# Patient Record
Sex: MALE | Race: WHITE | NOT HISPANIC OR LATINO
[De-identification: names, ages, dates, MRNs, and addresses within clinical notes are randomized per-mention and may not be internally consistent; named-entity substitution may affect disease eponyms.]

---

## 2017-09-05 ENCOUNTER — APPOINTMENT (OUTPATIENT)
Dept: ORTHOPEDIC SURGERY | Facility: CLINIC | Age: 52
End: 2017-09-05
Payer: OTHER MISCELLANEOUS

## 2017-09-05 VITALS — HEIGHT: 68 IN | BODY MASS INDEX: 28.19 KG/M2 | WEIGHT: 186 LBS

## 2017-09-05 DIAGNOSIS — Z87.891 PERSONAL HISTORY OF NICOTINE DEPENDENCE: ICD-10-CM

## 2017-09-05 DIAGNOSIS — M54.2 CERVICALGIA: ICD-10-CM

## 2017-09-05 DIAGNOSIS — Z80.1 FAMILY HISTORY OF MALIGNANT NEOPLASM OF TRACHEA, BRONCHUS AND LUNG: ICD-10-CM

## 2017-09-05 PROCEDURE — 99203 OFFICE O/P NEW LOW 30 MIN: CPT

## 2017-09-05 PROCEDURE — 72040 X-RAY EXAM NECK SPINE 2-3 VW: CPT

## 2017-10-03 ENCOUNTER — APPOINTMENT (OUTPATIENT)
Dept: ORTHOPEDIC SURGERY | Facility: CLINIC | Age: 52
End: 2017-10-03
Payer: OTHER MISCELLANEOUS

## 2017-10-03 PROCEDURE — 99213 OFFICE O/P EST LOW 20 MIN: CPT

## 2019-06-10 ENCOUNTER — RECORD ABSTRACTING (OUTPATIENT)
Age: 54
End: 2019-06-10

## 2019-06-10 DIAGNOSIS — Z94.0 KIDNEY TRANSPLANT STATUS: ICD-10-CM

## 2019-06-10 DIAGNOSIS — N13.729 VESICOURETERAL-REFLUX WITH REFLUX NEPHROPATHY W/OUT HYDROURETER, UNSPECIFIED: ICD-10-CM

## 2019-06-10 DIAGNOSIS — Z92.89 PERSONAL HISTORY OF OTHER MEDICAL TREATMENT: ICD-10-CM

## 2019-06-10 DIAGNOSIS — D49.9 NEOPLASM OF UNSPECIFIED BEHAVIOR OF UNSPECIFIED SITE: ICD-10-CM

## 2019-06-10 DIAGNOSIS — Q60.5 RENAL HYPOPLASIA, UNSPECIFIED: ICD-10-CM

## 2019-06-10 DIAGNOSIS — Z85.828 PERSONAL HISTORY OF OTHER MALIGNANT NEOPLASM OF SKIN: ICD-10-CM

## 2019-06-10 DIAGNOSIS — Z99.2 DEPENDENCE ON RENAL DIALYSIS: ICD-10-CM

## 2019-06-10 DIAGNOSIS — Z83.49 FAMILY HISTORY OF OTHER ENDOCRINE, NUTRITIONAL AND METABOLIC DISEASES: ICD-10-CM

## 2019-06-10 DIAGNOSIS — Z80.8 FAMILY HISTORY OF MALIGNANT NEOPLASM OF OTHER ORGANS OR SYSTEMS: ICD-10-CM

## 2019-06-10 RX ORDER — NIACINAMIDE 500 MG
500 TABLET ORAL DAILY
Refills: 0 | Status: ACTIVE | COMMUNITY

## 2019-07-29 ENCOUNTER — APPOINTMENT (OUTPATIENT)
Dept: NEPHROLOGY | Facility: CLINIC | Age: 54
End: 2019-07-29
Payer: COMMERCIAL

## 2019-07-29 VITALS
HEIGHT: 68 IN | RESPIRATION RATE: 14 BRPM | SYSTOLIC BLOOD PRESSURE: 118 MMHG | HEART RATE: 69 BPM | BODY MASS INDEX: 29.1 KG/M2 | OXYGEN SATURATION: 97 % | WEIGHT: 192 LBS | DIASTOLIC BLOOD PRESSURE: 74 MMHG

## 2019-07-29 DIAGNOSIS — E05.90 THYROTOXICOSIS, UNSPECIFIED W/OUT THYROTOXIC CRISIS OR STORM: ICD-10-CM

## 2019-07-29 DIAGNOSIS — N40.0 BENIGN PROSTATIC HYPERPLASIA WITHOUT LOWER URINARY TRACT SYMPMS: ICD-10-CM

## 2019-07-29 DIAGNOSIS — Z85.828 PERSONAL HISTORY OF OTHER MALIGNANT NEOPLASM OF SKIN: ICD-10-CM

## 2019-07-29 DIAGNOSIS — Z87.39 PERSONAL HISTORY OF OTHER DISEASES OF THE MUSCULOSKELETAL SYSTEM AND CONNECTIVE TISSUE: ICD-10-CM

## 2019-07-29 DIAGNOSIS — M50.20 OTHER CERVICAL DISC DISPLACEMENT, UNSPECIFIED CERVICAL REGION: ICD-10-CM

## 2019-07-29 PROCEDURE — 99215 OFFICE O/P EST HI 40 MIN: CPT

## 2019-07-29 RX ORDER — AZATHIOPRINE 50 MG/1
TABLET ORAL
Refills: 0 | Status: DISCONTINUED | COMMUNITY
End: 2019-07-29

## 2019-07-29 RX ORDER — MULTIVITAMIN
TABLET ORAL DAILY
Refills: 0 | Status: DISCONTINUED | COMMUNITY
End: 2019-07-29

## 2019-07-29 RX ORDER — AZATHIOPRINE 50 1/1
50 TABLET ORAL DAILY
Refills: 0 | Status: DISCONTINUED | COMMUNITY
End: 2019-07-29

## 2019-07-29 RX ORDER — RANITIDINE HYDROCHLORIDE 150 MG/1
150 CAPSULE ORAL TWICE DAILY
Refills: 0 | Status: DISCONTINUED | COMMUNITY
End: 2019-07-29

## 2019-07-29 RX ORDER — MULTIVIT-MIN/FOLIC/VIT K/LYCOP 400-300MCG
25 MCG TABLET ORAL
Refills: 0 | Status: DISCONTINUED | COMMUNITY
End: 2019-07-29

## 2019-07-29 NOTE — REVIEW OF SYSTEMS
[Nocturia] : nocturia [Negative] : Neurological [Hesitancy] : no urinary hesitancy [FreeTextEntry8] : nocturia x 1-2, good urinary stream.  The stream was poor for around a month though this resolved.

## 2019-07-29 NOTE — ASSESSMENT
[FreeTextEntry1] : (1) Renal Transplant.  The BUN/creatinine is as follows:  34/1.8 (05/2016), 26/1.70 (01/2017), 30/1.71 (03/2018), 34/2.22 (08/2018), 36/2.0 (09/2018), 25/2.2 (02/2019), and most recently 37/2.13 (07/17/2019).  The urine protein to creatinine ratio is 1510 mg per gram.  BK virus is negative.  The serum creatinine has trended up slowly over the past few years.  \par \par (2) Hypertension.  Well controlled.\par \par (3) Dyslipidemia. LDL 93 mg/dL, HDL 38 mg/dL,  mg/dL, total cholesterol 198 mg/dL.\par \par (4) Squamous cell carcinoma\par \par (5) Hypercalcemia.  Unclear etiology.  The iPTH is 43 pg/cc.  The vitamin D and multivitamin might be contributing. \par \par PLAN:\par \par (1) Jayme has already stopped the vitamin D and multivitamin.  He will stay off of them for now.\par (2) Repeat lab set in around 6 weeks along with a full proteinuria workup.\par (3) Will check a PSA.\par (4) Thyroid function tests. \par (5) Stay well hydrated. \par (6) Maintenance:  I am advising Jayme to get a colonoscopy. \par (7) Continue to take Lipitor.

## 2019-07-29 NOTE — PHYSICAL EXAM
[General Appearance - Alert] : alert [General Appearance - In No Acute Distress] : in no acute distress [General Appearance - Well-Appearing] : healthy appearing [General Appearance - Well Developed] : well developed [General Appearance - Well Nourished] : well nourished [Sclera] : the sclera and conjunctiva were normal [Extraocular Movements] : extraocular movements were intact [Neck Appearance] : the appearance of the neck was normal [] : no respiratory distress [Auscultation Breath Sounds / Voice Sounds] : lungs were clear to auscultation bilaterally [Exaggerated Use Of Accessory Muscles For Inspiration] : no accessory muscle use [Heart Rate And Rhythm] : heart rate was normal and rhythm regular [Heart Sounds Gallop] : no gallops [Murmurs] : no murmurs [Heart Sounds] : normal S1 and S2 [Edema] : there was no peripheral edema [Heart Sounds Pericardial Friction Rub] : no pericardial rub [Bowel Sounds] : normal bowel sounds [Abdomen Tenderness] : non-tender [Abdomen Soft] : soft [Abnormal Walk] : normal gait [No Spinal Tenderness] : no spinal tenderness [No CVA Tenderness] : no ~M costovertebral angle tenderness [Involuntary Movements] : no involuntary movements were seen [No Focal Deficits] : no focal deficits [Impaired Insight] : insight and judgment were intact [Affect] : the affect was normal [Oriented To Time, Place, And Person] : oriented to person, place, and time [Mood] : the mood was normal [FreeTextEntry1] : small right facial light colored lesion

## 2019-11-19 ENCOUNTER — APPOINTMENT (OUTPATIENT)
Dept: NEPHROLOGY | Facility: CLINIC | Age: 54
End: 2019-11-19
Payer: COMMERCIAL

## 2019-11-19 VITALS
RESPIRATION RATE: 16 BRPM | BODY MASS INDEX: 29.1 KG/M2 | SYSTOLIC BLOOD PRESSURE: 126 MMHG | HEART RATE: 76 BPM | HEIGHT: 68 IN | WEIGHT: 192 LBS | OXYGEN SATURATION: 96 % | DIASTOLIC BLOOD PRESSURE: 82 MMHG

## 2019-11-19 DIAGNOSIS — K21.9 GASTRO-ESOPHAGEAL REFLUX DISEASE W/OUT ESOPHAGITIS: ICD-10-CM

## 2019-11-19 DIAGNOSIS — Z00.00 ENCOUNTER FOR GENERAL ADULT MEDICAL EXAMINATION W/OUT ABNORMAL FINDINGS: ICD-10-CM

## 2019-11-19 PROCEDURE — 99214 OFFICE O/P EST MOD 30 MIN: CPT

## 2019-11-19 NOTE — PHYSICAL EXAM
[General Appearance - Alert] : alert [General Appearance - In No Acute Distress] : in no acute distress [General Appearance - Well Nourished] : well nourished [General Appearance - Well Developed] : well developed [Sclera] : the sclera and conjunctiva were normal [General Appearance - Well-Appearing] : healthy appearing [Extraocular Movements] : extraocular movements were intact [Neck Appearance] : the appearance of the neck was normal [] : no respiratory distress [Auscultation Breath Sounds / Voice Sounds] : lungs were clear to auscultation bilaterally [Heart Rate And Rhythm] : heart rate was normal and rhythm regular [Exaggerated Use Of Accessory Muscles For Inspiration] : no accessory muscle use [Respiration, Rhythm And Depth] : normal respiratory rhythm and effort [Heart Sounds Gallop] : no gallops [Heart Sounds] : normal S1 and S2 [Heart Sounds Pericardial Friction Rub] : no pericardial rub [Murmurs] : no murmurs [Bowel Sounds] : normal bowel sounds [Edema] : there was no peripheral edema [Abdomen Soft] : soft [No CVA Tenderness] : no ~M costovertebral angle tenderness [Abdomen Tenderness] : non-tender [No Spinal Tenderness] : no spinal tenderness [Abnormal Walk] : normal gait [Involuntary Movements] : no involuntary movements were seen [No Focal Deficits] : no focal deficits [Oriented To Time, Place, And Person] : oriented to person, place, and time [Affect] : the affect was normal [Impaired Insight] : insight and judgment were intact [Mood] : the mood was normal

## 2019-11-19 NOTE — CONSULT LETTER
[Consult Letter:] : I had the pleasure of evaluating your patient, [unfilled]. [Dear  ___] : Dear  [unfilled], [Please see my note below.] : Please see my note below. [Consult Closing:] : Thank you very much for allowing me to participate in the care of this patient.  If you have any questions, please do not hesitate to contact me. [Sincerely,] : Sincerely, [DrDemetrio  ___] : Dr. ARREDONDO [FreeTextEntry3] : Jean

## 2019-11-19 NOTE — HISTORY OF PRESENT ILLNESS
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 In 1984 he underwent a living related renal transplant at [facility] with Dr. Wong Higuera.  The donor was his mother.  I started seeing Mr. Bates after Dr. Samson Negrete moved his office.  His last visit to [facility] was "a long time ago".  He had a diagnosis of squamous cell carcinoma.  It was excised.  his last visit to his dermatologist was around 6 months ago.  He is going again on December 14, 2019.  \par \par Jayme has been doing well.  He gets a cough "every year around October".  It has been present for around 5 weeks.  it is a "dry" non productive cough.  Last year he used "some kind of inhaler".  It worked.  He is not sure which inhaler it was. Otherwise, he has been doing well.

## 2020-04-26 ENCOUNTER — MESSAGE (OUTPATIENT)
Age: 55
End: 2020-04-26

## 2020-05-11 ENCOUNTER — RX RENEWAL (OUTPATIENT)
Age: 55
End: 2020-05-11

## 2020-06-23 ENCOUNTER — APPOINTMENT (OUTPATIENT)
Dept: NEPHROLOGY | Facility: CLINIC | Age: 55
End: 2020-06-23
Payer: COMMERCIAL

## 2020-06-23 VITALS
DIASTOLIC BLOOD PRESSURE: 82 MMHG | BODY MASS INDEX: 30.01 KG/M2 | HEART RATE: 75 BPM | OXYGEN SATURATION: 97 % | TEMPERATURE: 98.1 F | RESPIRATION RATE: 16 BRPM | WEIGHT: 198 LBS | HEIGHT: 68 IN | SYSTOLIC BLOOD PRESSURE: 128 MMHG

## 2020-06-23 DIAGNOSIS — Z12.5 ENCOUNTER FOR SCREENING FOR MALIGNANT NEOPLASM OF PROSTATE: ICD-10-CM

## 2020-06-23 PROCEDURE — 99214 OFFICE O/P EST MOD 30 MIN: CPT

## 2020-06-23 NOTE — ASSESSMENT
[FreeTextEntry1] : (1) Renal Transplant.  The BUN/creatinine is as follows:  34/1.8 (05/2016), 26/1.70 (01/2017), 30/1.71 (03/2018), 34/2.22 (08/2018), 36/2.0 (09/2018), 25/2.2 (02/2019),  37/2.13 (07/17/2019), 32/2.02 (11/11/2019), 42/2.19 (05/30/2020).  The urine protein to creatinine ratio was around 1.5 grams per gram though has recently increased to 1.9 grams per gram.   The BK virus by PCR is negative. \par \par (2) Hypertension.  Well controlled.\par \par (3) Dyslipidemia. LDL 99 mg/dL, HDL 41 mg/dL,  mg/dL, total cholesterol 190 mg/dL.\par \par (4) Squamous cell carcinoma.  \par \par (5) Hypercalcemia.  The prior calcium was 10.8 mg/dL with a serum albumin 3.3 g/dL.  The work up included: iPTH was 43 pg/cc, 1,2 5 dihydroxy vitamin D level i22.4 pg/cc, 25 hydroxy vitamin D 29.9 ng/cc, TSH 1.170,  SPEP (-)  and urine immunofixation studies (-).  Mr. Bates stopped taking Tums on a regular basis after starting pantoprazole.  His serum calcium has normalized. at 9.7 mg/dL (albumin 4.1 g/dL).\par \par (6) Mild hyperparathyroidism (iPTH 80 pg/mL) with a low 25 hydroxy vitamin D level (24.8 ng/mL).\par \par (7) Metabolic acidosis.  This is mild (19 meq/L).\par \par (8) Proteinuria.  The nephronic workup was negative. \par \par PLAN:\par \par (1) Continue the pantoprazole. \par (2) Start vitamin D3 1000 IU every other day.\par (3) Given that squamous cell carcinoma has been known to metastasize in renal transplant patients, Mr. Bates will continue to see Dr. Aaron Falk every 3 months.  \par (4) Continue the current regimen.\par (5) Repeat the lab studies in 3 month.\par (6) I made no changes to the medication regimen. \par \par I placed a call to Dr. Catracho Rodriguez of the [facility].  I will follow up with Mr. Bates after I speak with him. \par \par

## 2020-06-23 NOTE — PHYSICAL EXAM
[General Appearance - Alert] : alert [General Appearance - In No Acute Distress] : in no acute distress [General Appearance - Well Nourished] : well nourished [General Appearance - Well Developed] : well developed [Extraocular Movements] : extraocular movements were intact [General Appearance - Well-Appearing] : healthy appearing [Sclera] : the sclera and conjunctiva were normal [Neck Appearance] : the appearance of the neck was normal [Respiration, Rhythm And Depth] : normal respiratory rhythm and effort [] : no respiratory distress [Heart Rate And Rhythm] : heart rate was normal and rhythm regular [Exaggerated Use Of Accessory Muscles For Inspiration] : no accessory muscle use [Auscultation Breath Sounds / Voice Sounds] : lungs were clear to auscultation bilaterally [Murmurs] : no murmurs [Heart Sounds Gallop] : no gallops [Heart Sounds] : normal S1 and S2 [Bowel Sounds] : normal bowel sounds [Heart Sounds Pericardial Friction Rub] : no pericardial rub [Edema] : there was no peripheral edema [Abdomen Soft] : soft [No CVA Tenderness] : no ~M costovertebral angle tenderness [Abdomen Tenderness] : non-tender [No Spinal Tenderness] : no spinal tenderness [Involuntary Movements] : no involuntary movements were seen [Abnormal Walk] : normal gait [No Focal Deficits] : no focal deficits [Oriented To Time, Place, And Person] : oriented to person, place, and time [Affect] : the affect was normal [Impaired Insight] : insight and judgment were intact [Mood] : the mood was normal [Nail Clubbing] : no clubbing  or cyanosis of the fingernails [FreeTextEntry1] : bilateral UE/LE skin lesions

## 2020-06-23 NOTE — REVIEW OF SYSTEMS
[Negative] : Heme/Lymph [Feeling Tired] : feeling tired [Recent Weight Gain (___ Lbs)] : recent [unfilled] ~Ulb weight gain [Nocturia] : nocturia [As Noted in HPI] : as noted in HPI [FreeTextEntry2] : "I'm not a great sleeper" [FreeTextEntry8] : nocturia x 1

## 2020-06-23 NOTE — CONSULT LETTER
[Dear  ___] : Dear  [unfilled], [Please see my note below.] : Please see my note below. [Consult Letter:] : I had the pleasure of evaluating your patient, [unfilled]. [Consult Closing:] : Thank you very much for allowing me to participate in the care of this patient.  If you have any questions, please do not hesitate to contact me. [Sincerely,] : Sincerely, [DrDemetrio  ___] : Dr. ARREDONDO [FreeTextEntry3] : Jean

## 2020-06-23 NOTE — HISTORY OF PRESENT ILLNESS
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 In 1984 he underwent a living related renal transplant at [facility] with Dr. Wong Higuera.  The donor was his mother.  I started seeing Mr. Bates after Dr. Samson Negrete moved his office.  His last visit to [facility] was "a long time ago".  He had a diagnosis of squamous cell carcinoma.  It was excised.  his last visit to his dermatologist was 2 weeks ago.  Some lesions were frozen.  He had 5-FU injections in his hands.  \par \par Jayme has been doing well.  He continues to work in the hospital.  He has not been ill.  He recently changed his Zantac to pantoprazole 20 mg once daily. \par

## 2020-06-24 ENCOUNTER — TRANSCRIPTION ENCOUNTER (OUTPATIENT)
Age: 55
End: 2020-06-24

## 2020-09-22 ENCOUNTER — APPOINTMENT (OUTPATIENT)
Dept: NEPHROLOGY | Facility: CLINIC | Age: 55
End: 2020-09-22
Payer: COMMERCIAL

## 2020-09-22 VITALS
RESPIRATION RATE: 16 BRPM | SYSTOLIC BLOOD PRESSURE: 118 MMHG | WEIGHT: 194 LBS | HEART RATE: 68 BPM | BODY MASS INDEX: 29.4 KG/M2 | HEIGHT: 68 IN | DIASTOLIC BLOOD PRESSURE: 58 MMHG | TEMPERATURE: 98 F | OXYGEN SATURATION: 95 %

## 2020-09-22 PROCEDURE — 99214 OFFICE O/P EST MOD 30 MIN: CPT

## 2020-09-22 NOTE — ASSESSMENT
[FreeTextEntry1] : (1) Renal Transplant.  The BUN/creatinine is as follows:  34/1.8 (05/2016), 26/1.70 (01/2017), 30/1.71 (03/2018), 34/2.22 (08/2018), 36/2.0 (09/2018), 25/2.2 (02/2019),  37/2.13 (07/17/2019), 32/2.02 (11/11/2019), 42/2.19 (05/30/2020).  The urine protein to creatinine ratio was around 1.5 grams per gram though then  increased to 1.9 grams per gram.   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  Mr. Bates was started on losartan 25 mg once daily.  He has been tolerating this.  His repeat BUN/creatinine levels are 43/2.23 (09/11/2020).  His urine protein to creatinine ratio improved to 1236 mg per gram.  His BK virus (plasma PCR) remains negative. His serum carbon dioxide is 18 meq per liter.  \par \par (2) Hypertension.  Well controlled.\par \par (3) Dyslipidemia. LDL 88 mg/dL, HDL 35 mg/dL,  mg/dL, total cholesterol 162 mg/dL.\par \par (4) Squamous cell carcinoma.  Scheduled for resection on 10/06/2020.\par \par (5) Hypercalcemia.  The workup was negative.  The serum calcium normalized after discontinuing Tums.\par \par (6) Mild hyperparathyroidism (iPTH 80 pg/mL) with a low 25 hydroxy vitamin D level (24.8 ng/mL). This resolved on oral vitamin D.  iPTH 41 pg/mL, 25 hydroxy vitamin D 31.4 ng/mL. \par \par (7) Metabolic acidosis.  The serum carbon dioxide is 18 meq per liter. \par \par (8) Proteinuria.  The nephronic workup was negative. Biopsy --> FSGS.\par \par (9) PSA.  1.2 ng/mL\par \par PLAN:\par \par (1) Continue the pantoprazole. \par (2) Continue vitamin D3 1000 IU every day.\par (3) Given that squamous cell carcinoma has been known to metastasize in renal transplant patients, Mr. Bates will continue to see Dr. Aaron Falk every 3 months.  \par (4) Continue the current regimen.\par (5) Repeat the lab studies in 3 month.\par (6) I made no changes to the medication regimen. \par \par I placed a call to Dr. Catracho Rodriguez of the [facility].  I will follow up with Mr. Bates after I speak with him. \par \par

## 2020-09-22 NOTE — PHYSICAL EXAM
[General Appearance - Alert] : alert [General Appearance - In No Acute Distress] : in no acute distress [General Appearance - Well Nourished] : well nourished [General Appearance - Well Developed] : well developed [General Appearance - Well-Appearing] : healthy appearing [Sclera] : the sclera and conjunctiva were normal [Extraocular Movements] : extraocular movements were intact [Neck Appearance] : the appearance of the neck was normal [] : no respiratory distress [Respiration, Rhythm And Depth] : normal respiratory rhythm and effort [Exaggerated Use Of Accessory Muscles For Inspiration] : no accessory muscle use [Auscultation Breath Sounds / Voice Sounds] : lungs were clear to auscultation bilaterally [Heart Rate And Rhythm] : heart rate was normal and rhythm regular [Heart Sounds] : normal S1 and S2 [Heart Sounds Gallop] : no gallops [Murmurs] : no murmurs [Heart Sounds Pericardial Friction Rub] : no pericardial rub [Edema] : there was no peripheral edema [Bowel Sounds] : normal bowel sounds [Abdomen Soft] : soft [Abdomen Tenderness] : non-tender [No CVA Tenderness] : no ~M costovertebral angle tenderness [No Spinal Tenderness] : no spinal tenderness [Abnormal Walk] : normal gait [Involuntary Movements] : no involuntary movements were seen [FreeTextEntry1] : bilateral UE/LE skin lesions [No Focal Deficits] : no focal deficits [Oriented To Time, Place, And Person] : oriented to person, place, and time [Impaired Insight] : insight and judgment were intact [Affect] : the affect was normal [Mood] : the mood was normal

## 2020-09-22 NOTE — REVIEW OF SYSTEMS
[Feeling Tired] : feeling tired [Recent Weight Gain (___ Lbs)] : recent [unfilled] ~Ulb weight gain [Nocturia] : nocturia [As Noted in HPI] : as noted in HPI [Negative] : Heme/Lymph [FreeTextEntry2] : "I'm not a great sleeper" [FreeTextEntry8] : nocturia x 1

## 2020-09-22 NOTE — CONSULT LETTER
[Dear  ___] : Dear  [unfilled], [Consult Letter:] : I had the pleasure of evaluating your patient, [unfilled]. [Please see my note below.] : Please see my note below. [Consult Closing:] : Thank you very much for allowing me to participate in the care of this patient.  If you have any questions, please do not hesitate to contact me. [Sincerely,] : Sincerely, [FreeTextEntry3] : Jean [DrDemetrio  ___] : Dr. ARREDONDO

## 2020-09-22 NOTE — HISTORY OF PRESENT ILLNESS
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 In 1984 he underwent a living related renal transplant at [facility] with Dr. Wong Higuera.  The donor was his mother.  I started seeing Mr. Bates after Dr. Samson Negrete moved his office.  His [place] [facility] [place] this [place].  He underwent a biopsy of the renal transplant which noted FSGS without any compoenents of rejection.\par \par Jayme is here for follow up.  He is having another squamous cell carcinoma removed (this time from his left ear) on October 6 with Dr. Falk.  he is feeling well.  He continues to take pantoprazole 20 mg once daily.  He is no longer using Tums.

## 2020-11-01 ENCOUNTER — RX RENEWAL (OUTPATIENT)
Age: 55
End: 2020-11-01

## 2021-01-14 ENCOUNTER — NON-APPOINTMENT (OUTPATIENT)
Age: 56
End: 2021-01-14

## 2021-01-19 ENCOUNTER — APPOINTMENT (OUTPATIENT)
Dept: NEPHROLOGY | Facility: CLINIC | Age: 56
End: 2021-01-19
Payer: COMMERCIAL

## 2021-01-19 VITALS
HEIGHT: 68 IN | OXYGEN SATURATION: 98 % | BODY MASS INDEX: 28.95 KG/M2 | HEART RATE: 78 BPM | TEMPERATURE: 97.2 F | RESPIRATION RATE: 16 BRPM | WEIGHT: 191 LBS | DIASTOLIC BLOOD PRESSURE: 80 MMHG | SYSTOLIC BLOOD PRESSURE: 122 MMHG

## 2021-01-19 PROCEDURE — 99214 OFFICE O/P EST MOD 30 MIN: CPT

## 2021-01-19 PROCEDURE — 99072 ADDL SUPL MATRL&STAF TM PHE: CPT

## 2021-01-19 NOTE — HISTORY OF PRESENT ILLNESS
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 In 1984 he underwent a living related renal transplant at [facility] with Dr. Wong Higuera.  The donor was his mother.  I started seeing Mr. Bates after Dr. Samson Negrete moved his office.  His last visit to [facility] [place] earlier this summer.  He underwent a biopsy of the renal transplant which noted FSGS without any components of rejection.\par \par Jayme is here for follow up.  He recently had a squamous cell carcinoma removed with Dr. Aaron Falk.  He is concerned about his weight, commenting, "I'm not a good eater".

## 2021-01-19 NOTE — REVIEW OF SYSTEMS
[Feeling Tired] : feeling tired [Recent Weight Gain (___ Lbs)] : recent [unfilled] ~Ulb weight gain [Nocturia] : nocturia [As Noted in HPI] : as noted in HPI [Negative] : Heme/Lymph [FreeTextEntry2] : "I'm not a great sleeper" [FreeTextEntry8] : nocturia x 2

## 2021-01-19 NOTE — ASSESSMENT
[FreeTextEntry1] : (1) Renal Transplant.  The BUN/creatinine is as follows:  34/1.8 (05/2016), 26/1.70 (01/2017), 30/1.71 (03/2018), 34/2.22 (08/2018), 36/2.0 (09/2018), 25/2.2 (02/2019),  37/2.13 (07/17/2019), 32/2.02 (11/11/2019), 42/2.19 (05/30/2020), 43/2.23 (09/11/2020),  44/2.21 (01/09/2021).  The urine protein to creatinine ratio was around 1.5 grams per gram though then  increased to 1.9 grams per gram.   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  Mr. Bates was started on losartan 25 mg once daily.  His urine protein to creatniine ratio has decreased to 1.24 grams per gram.  Repeat urine protein to creatinine ratio is1.37 grams per gram. \par \par (2) Hypertension.  Well controlled.\par \par (3) Dyslipidemia. No recent studies. \par \par (4) Squamous cell carcinoma.  Recent resection. \par \par (5) Hypercalcemia.  The workup was negative.  The serum calcium normalized after discontinuing Tums.\par \par (6) Mild hyperparathyroidism.  The 25 hydroxy vitamin D level is 19.1 ng/mL. \par \par (7) Metabolic acidosis.  The serum carbon dioxide is 19 meq per liter. \par \par (8) Proteinuria.  The nephronic workup was negative. Biopsy --> FSGS.  See above. \par \par (9) PSA.  1.2 ng/mL\par \par (10) Obesity.  Raphael has a BMI of 29.04 kg per meter square.  He is no longer obese.  He is merely overweight though feels he eats very poorly. \par \par PLAN:\par \par (1) Continue the pantoprazole. \par (2) Continue vitamin D3 1000 IU every day.\par (3) Given that squamous cell carcinoma has been known to metastasize in renal transplant patients, Mr. Bates will continue to see Dr. Aaron Falk every 3 months.  \par (4) Increase the losartan to 50 mg once daily. \par (5) Given the history of Stage V CKD s/p renal transplant, and proteinuria, I am referring Jayme for dietician consult. \par (6) I made no changes to the medication regimen. \par \par I placed a call to Dr. Catracho Rodriguez of the [facility].  I will follow up with Mr. Bates after I speak with him. \par \par

## 2021-01-19 NOTE — PHYSICAL EXAM
[General Appearance - Alert] : alert [General Appearance - Well Nourished] : well nourished [General Appearance - In No Acute Distress] : in no acute distress [General Appearance - Well Developed] : well developed [General Appearance - Well-Appearing] : healthy appearing [Sclera] : the sclera and conjunctiva were normal [Extraocular Movements] : extraocular movements were intact [Neck Appearance] : the appearance of the neck was normal [] : no respiratory distress [Respiration, Rhythm And Depth] : normal respiratory rhythm and effort [Exaggerated Use Of Accessory Muscles For Inspiration] : no accessory muscle use [Auscultation Breath Sounds / Voice Sounds] : lungs were clear to auscultation bilaterally [Heart Rate And Rhythm] : heart rate was normal and rhythm regular [Heart Sounds] : normal S1 and S2 [Heart Sounds Gallop] : no gallops [Murmurs] : no murmurs [Heart Sounds Pericardial Friction Rub] : no pericardial rub [Edema] : there was no peripheral edema [Bowel Sounds] : normal bowel sounds [Abdomen Soft] : soft [Abdomen Tenderness] : non-tender [No CVA Tenderness] : no ~M costovertebral angle tenderness [No Spinal Tenderness] : no spinal tenderness [Abnormal Walk] : normal gait [Involuntary Movements] : no involuntary movements were seen [No Focal Deficits] : no focal deficits [Impaired Insight] : insight and judgment were intact [Oriented To Time, Place, And Person] : oriented to person, place, and time [Affect] : the affect was normal [Mood] : the mood was normal [Nail Clubbing] : no clubbing  or cyanosis of the fingernails [Skin Color & Pigmentation] : normal skin color and pigmentation

## 2021-01-19 NOTE — CONSULT LETTER
[Dear  ___] : Dear  [unfilled], [Consult Letter:] : I had the pleasure of evaluating your patient, [unfilled]. [Consult Closing:] : Thank you very much for allowing me to participate in the care of this patient.  If you have any questions, please do not hesitate to contact me. [Please see my note below.] : Please see my note below. [Sincerely,] : Sincerely, [DrDemetrio  ___] : Dr. ARREDONDO [FreeTextEntry3] : Jean

## 2021-02-04 ENCOUNTER — APPOINTMENT (OUTPATIENT)
Dept: BARIATRICS | Facility: CLINIC | Age: 56
End: 2021-02-04
Payer: COMMERCIAL

## 2021-02-04 PROCEDURE — 98968 PH1 ASSMT&MGMT NQHP 21-30: CPT

## 2021-03-04 ENCOUNTER — APPOINTMENT (OUTPATIENT)
Dept: BARIATRICS | Facility: CLINIC | Age: 56
End: 2021-03-04
Payer: COMMERCIAL

## 2021-03-04 VITALS — WEIGHT: 187 LBS | BODY MASS INDEX: 28.34 KG/M2 | HEIGHT: 68 IN

## 2021-03-04 DIAGNOSIS — Z72.89 OTHER PROBLEMS RELATED TO LIFESTYLE: ICD-10-CM

## 2021-03-04 PROCEDURE — 97803 MED NUTRITION INDIV SUBSEQ: CPT | Mod: 95

## 2021-03-31 ENCOUNTER — APPOINTMENT (OUTPATIENT)
Dept: BARIATRICS | Facility: CLINIC | Age: 56
End: 2021-03-31

## 2021-04-30 ENCOUNTER — RX RENEWAL (OUTPATIENT)
Age: 56
End: 2021-04-30

## 2021-05-14 ENCOUNTER — RX RENEWAL (OUTPATIENT)
Age: 56
End: 2021-05-14

## 2021-05-18 ENCOUNTER — APPOINTMENT (OUTPATIENT)
Dept: NEPHROLOGY | Facility: CLINIC | Age: 56
End: 2021-05-18
Payer: COMMERCIAL

## 2021-05-18 VITALS
BODY MASS INDEX: 27.74 KG/M2 | WEIGHT: 183 LBS | HEIGHT: 68 IN | RESPIRATION RATE: 16 BRPM | DIASTOLIC BLOOD PRESSURE: 76 MMHG | SYSTOLIC BLOOD PRESSURE: 126 MMHG | HEART RATE: 74 BPM | TEMPERATURE: 98 F | OXYGEN SATURATION: 95 %

## 2021-05-18 DIAGNOSIS — R53.83 OTHER FATIGUE: ICD-10-CM

## 2021-05-18 PROCEDURE — 99214 OFFICE O/P EST MOD 30 MIN: CPT

## 2021-05-18 PROCEDURE — 99072 ADDL SUPL MATRL&STAF TM PHE: CPT

## 2021-05-18 RX ORDER — CEPHALEXIN 500 MG/1
500 CAPSULE ORAL
Qty: 15 | Refills: 0 | Status: DISCONTINUED | COMMUNITY
Start: 2020-10-06 | End: 2021-05-18

## 2021-05-18 RX ORDER — PANTOPRAZOLE 40 MG/1
40 TABLET, DELAYED RELEASE ORAL
Refills: 0 | Status: DISCONTINUED | COMMUNITY
End: 2021-05-18

## 2021-05-18 RX ORDER — DOXYCYCLINE 100 MG/1
100 TABLET, FILM COATED ORAL
Qty: 20 | Refills: 0 | Status: DISCONTINUED | COMMUNITY
Start: 2021-03-27 | End: 2021-05-18

## 2021-05-18 RX ORDER — MUPIROCIN 20 MG/G
2 OINTMENT TOPICAL
Qty: 22 | Refills: 0 | Status: DISCONTINUED | COMMUNITY
Start: 2020-10-06 | End: 2021-05-18

## 2021-05-18 NOTE — CONSULT LETTER
[Dear  ___] : Dear  [unfilled], [Consult Letter:] : I had the pleasure of evaluating your patient, [unfilled]. [Please see my note below.] : Please see my note below. [Consult Closing:] : Thank you very much for allowing me to participate in the care of this patient.  If you have any questions, please do not hesitate to contact me. [Sincerely,] : Sincerely, [DrDemetrio  ___] : Dr. ARREDONDO [FreeTextEntry3] : Jean

## 2021-05-18 NOTE — REVIEW OF SYSTEMS
[Feeling Tired] : feeling tired [Nocturia] : nocturia [Negative] : Heme/Lymph [As Noted in HPI] : as noted in HPI [Recent Weight Loss (___ Lbs)] : recent [unfilled] ~Ulb weight loss [FreeTextEntry2] : "i'm not a good sleeper".  The regular exercise did not improve sleep "for the most part" [FreeTextEntry8] : nocturia x 2

## 2021-05-18 NOTE — HISTORY OF PRESENT ILLNESS
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 In 1984 he underwent a living related renal transplant at [facility] with Dr. Wong Higuera.  The donor was his mother.  I started seeing Mr. Bates after Dr. Samson Negrete moved his office.  His last visit to [facility] was in the summer of 2020.  He underwent a biopsy of the renal transplant which noted FSGS without any components of rejection.\par \par Jayme is here for follow up.  He saw Dr. Aaron Falk in March 2021.  There are no new squamous cell carcinomas.   He saw Penny Rosales of the weight management service. He started riding a Peloton Bike. He reports his weight decreased from 196 pounds to 181 pounds.

## 2021-05-18 NOTE — PHYSICAL EXAM
[General Appearance - Alert] : alert [General Appearance - In No Acute Distress] : in no acute distress [General Appearance - Well Nourished] : well nourished [General Appearance - Well-Appearing] : healthy appearing [General Appearance - Well Developed] : well developed [Sclera] : the sclera and conjunctiva were normal [Extraocular Movements] : extraocular movements were intact [Neck Appearance] : the appearance of the neck was normal [] : no respiratory distress [Respiration, Rhythm And Depth] : normal respiratory rhythm and effort [Exaggerated Use Of Accessory Muscles For Inspiration] : no accessory muscle use [Auscultation Breath Sounds / Voice Sounds] : lungs were clear to auscultation bilaterally [Heart Rate And Rhythm] : heart rate was normal and rhythm regular [Heart Sounds] : normal S1 and S2 [Heart Sounds Gallop] : no gallops [Murmurs] : no murmurs [Heart Sounds Pericardial Friction Rub] : no pericardial rub [Edema] : there was no peripheral edema [Bowel Sounds] : normal bowel sounds [Abdomen Soft] : soft [Abdomen Tenderness] : non-tender [No CVA Tenderness] : no ~M costovertebral angle tenderness [No Spinal Tenderness] : no spinal tenderness [Abnormal Walk] : normal gait [Involuntary Movements] : no involuntary movements were seen [No Focal Deficits] : no focal deficits [Impaired Insight] : insight and judgment were intact [Oriented To Time, Place, And Person] : oriented to person, place, and time [Affect] : the affect was normal [Mood] : the mood was normal

## 2021-05-18 NOTE — ASSESSMENT
[FreeTextEntry1] : (1) Renal Transplant.  The BUN/creatinine is as follows:  34/1.8 (05/2016), 26/1.70 (01/2017), 30/1.71 (03/2018), 34/2.22 (08/2018), 36/2.0 (09/2018), 25/2.2 (02/2019),  37/2.13 (07/17/2019), 32/2.02 (11/11/2019), 42/2.19 (05/30/2020), 43/2.23 (09/11/2020),  44/2.21 (01/09/2021), and most recently 33/2.10 (04/113/2021), 38/2.27 (05/10/2021). The urine protein to creatinine ratio was around 1.5 grams per gram though then increased to 1.9 grams per gram.   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  Mr. Bates was started on losartan 25 mg once vivek which has since been increased to 25 mg twice daily..  His urine protein to creatinine ratio has decreased to 1.24 grams per gram.  Repeat urine protein to creatinine ratio was 1.37 grams per gram and is currently 0.912 grams per gram. \par \par Jayme misses the second losartan dose and the second sodium bicarbonate dose on occasion.\par \par (2) Hypertension.  Well controlled.\par \par (3) Dyslipidemia. Total cholesterol 191 mg/dL,  mg/dL, HDL 38 mg/dL,  mg/dL.\par \par (4) Squamous cell carcinoma.  Recent resection. \par \par (5) Hypercalcemia.  The workup was negative.  The serum calcium normalized after discontinuing Tums.\par \par (6) Mild hyperparathyroidism.  The iPTH is 103 pg/mL with a 25 hydroxy vitamin D level  that improved from 19.1 ng/mL to 39 ng/mL. \par \par (7) Metabolic acidosis.  The serum carbon dioxide remains at 19 meq per liter. \par \par (8) Proteinuria.  The nephrotic workup was negative. Biopsy --> FSGS.  See above. \par \par (9) PSA.  1.2 ng/mL\par \par (10) Obesity.  Raphael has been exercising and losing weight\par \par (11) Generalized weakness.  Jayme feels that he has been losing strength.  This is mild though has become more noticeable over the past year. \par \par PLAN:\par \par (1) Change the dosing of the losartan from 25 mg BID to 50 mg at bedtime\par (2) Continue vitamin D3 at the increased dose of  2000 IU every day.\par (3) Given that squamous cell carcinoma has been known to metastasize in renal transplant patients, Mr. Bates will continue to see Dr. Aaron Falk every 3 months.  \par (4) Continue the regular exercise and weight loss. \par (5)  Change the sodium bicarbonate from 650 mg twice daily (he misses doses) to 1300 mg once daily\par (6)  If the LDL remains elevated (with diet and exercise) we should think about increasing the dose of the statin in the future.\par \par Return in 3 months with a full set of lab studies.\par \par \par

## 2021-06-09 ENCOUNTER — RX RENEWAL (OUTPATIENT)
Age: 56
End: 2021-06-09

## 2021-09-13 ENCOUNTER — TRANSCRIPTION ENCOUNTER (OUTPATIENT)
Age: 56
End: 2021-09-13

## 2021-09-16 ENCOUNTER — APPOINTMENT (OUTPATIENT)
Dept: DISASTER EMERGENCY | Facility: HOSPITAL | Age: 56
End: 2021-09-16

## 2021-09-17 ENCOUNTER — TRANSCRIPTION ENCOUNTER (OUTPATIENT)
Age: 56
End: 2021-09-17

## 2021-09-27 ENCOUNTER — APPOINTMENT (OUTPATIENT)
Dept: NEPHROLOGY | Facility: CLINIC | Age: 56
End: 2021-09-27
Payer: COMMERCIAL

## 2021-09-27 PROCEDURE — 99214 OFFICE O/P EST MOD 30 MIN: CPT | Mod: 95

## 2021-09-27 NOTE — REVIEW OF SYSTEMS
[Feeling Tired] : feeling tired [Recent Weight Loss (___ Lbs)] : recent [unfilled] ~Ulb weight loss [Nocturia] : nocturia [As Noted in HPI] : as noted in HPI [Negative] : Heme/Lymph [FreeTextEntry2] : "i'm not a good sleeper".  [FreeTextEntry8] : nocturia x 2

## 2021-09-27 NOTE — ASSESSMENT
[FreeTextEntry1] : (1) Renal Transplant.  The BUN/creatinine is as follows:  34/1.8 (05/2016), 26/1.70 (01/2017), 30/1.71 (03/2018), 34/2.22 (08/2018), 36/2.0 (09/2018), 25/2.2 (02/2019),  37/2.13 (07/17/2019), 32/2.02 (11/11/2019), 42/2.19 (05/30/2020), 43/2.23 (09/11/2020),  44/2.21 (01/09/2021), 33/2.10 (04/113/2021), 38/2.27 (05/10/2021), and most recently 36/2.47 (09/04/2021). The urine protein to creatinine ratio was around 1.5 grams per gram though then increased to 1.9 grams per gram.   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  Mr. Bates was started on losartan 25 mg once daily (he had a difficult time sleeping on BID losartan and so cut out the evening dose).    His urine protein to creatinine ratio has decreased to 0.912 g/g and is currently  1.08 g/g.\par \par (2) Hypertension.  Well controlled per home measurements.\par \par (3) Dyslipidemia. Total cholesterol 191 mg/dL,  mg/dL, HDL 39 mg/dL,  mg/dL.\par \par (4) Squamous cell carcinoma.  Recent resection. \par \par (5) Hypercalcemia.  The workup was negative.  The serum calcium normalized after discontinuing Tums.\par \par (6) Mild hyperparathyroidism.  The iPTH is 75 pg/mL with a 25 hydroxy vitamin D level of 34.0 ng/dL.\par \par (7) Metabolic acidosis.  The serum carbon dioxide improved to 21 meq per liter. \par \par (8) Proteinuria.  The nephrotic workup was negative. Biopsy --> FSGS.  See above. \par \par (9) PSA.  1.2 ng/mL\par \par (10) Obesity.  Losing weight.\par \par (11) Generalized weakness.  The energy level improved after he started riding the [employer]. \par \par PLAN:\par \par (1) Continue the current regimen. \par (2) Given that squamous cell carcinoma has been known to metastasize in renal transplant patients, Mr. Baets will continue to see Dr. Aaron Falk every 3 months.  \par (3) Get back to the regular exercise and weight loss. \par (4)  Continue the sodium bicarbonate.\par (5)  If the LDL remains elevated (with diet and exercise) we should think about increasing the dose of the statin in the future.\par \par Return in 3 months with a full set of lab studies.\par \par \par

## 2021-09-27 NOTE — HISTORY OF PRESENT ILLNESS
[Home] : at home, [unfilled] , at the time of the visit. 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 In 1984 he underwent a living related renal transplant at [facility] with Dr. Wong Higuera.  The donor was his mother.  I started seeing Mr. Bates after Dr. Samson Negrete moved his office.  His last visit to [facility] was in the summer of 2020.  He underwent a biopsy of the renal transplant which noted FSGS without any components of rejection.\par \par I am seeing Jayme via telehealth video conferencing.  He was diagnosed with COVID 19 on 09/08/2021.  He remained home for 10 days and is currently back to work.  He had a headache, sore throat, and a cough at the time.  The dry cough is still present.  He is no longer short of breath.  He last saw Dr. Aaron Moulton and had a skin cancer removed (squamous cell).  His weight decreased from 196 to 178 pounds over the past few months.  He lost some of this weight since his diagnosis with COVID.  He has not yet started back on the [employer] Bike. \par

## 2021-09-27 NOTE — CONSULT LETTER
[Dear  ___] : Dear  [unfilled], [Consult Letter:] : I had the pleasure of evaluating your patient, [unfilled]. [Please see my note below.] : Please see my note below. [Consult Closing:] : Thank you very much for allowing me to participate in the care of this patient.  If you have any questions, please do not hesitate to contact me. [Sincerely,] : Sincerely, [FreeTextEntry3] : Jean

## 2021-11-14 ENCOUNTER — RX RENEWAL (OUTPATIENT)
Age: 56
End: 2021-11-14

## 2021-12-23 ENCOUNTER — APPOINTMENT (OUTPATIENT)
Dept: NEPHROLOGY | Facility: CLINIC | Age: 56
End: 2021-12-23
Payer: COMMERCIAL

## 2021-12-23 VITALS
SYSTOLIC BLOOD PRESSURE: 128 MMHG | DIASTOLIC BLOOD PRESSURE: 70 MMHG | RESPIRATION RATE: 17 BRPM | WEIGHT: 191 LBS | BODY MASS INDEX: 28.95 KG/M2 | TEMPERATURE: 99 F | OXYGEN SATURATION: 99 % | HEART RATE: 90 BPM | HEIGHT: 68 IN

## 2021-12-23 PROCEDURE — 99214 OFFICE O/P EST MOD 30 MIN: CPT

## 2021-12-23 NOTE — REVIEW OF SYSTEMS
[Recent Weight Loss (___ Lbs)] : recent [unfilled] ~Ulb weight loss [Nocturia] : nocturia [As Noted in HPI] : as noted in HPI [Negative] : Heme/Lymph [FreeTextEntry2] : "i'm not a good sleeper".  [FreeTextEntry8] : nocturia x 2

## 2021-12-23 NOTE — ASSESSMENT
[FreeTextEntry1] : (1) Renal Transplant.  The BUN/creatinine is as follows:  34/1.8 (05/2016), 26/1.70 (01/2017), 30/1.71 (03/2018), 34/2.22 (08/2018), 36/2.0 (09/2018), 25/2.2 (02/2019),  37/2.13 (07/17/2019), 32/2.02 (11/11/2019), 42/2.19 (05/30/2020), 43/2.23 (09/11/2020),  44/2.21 (01/09/2021), 33/2.10 (04/113/2021), 38/2.27 (05/10/2021), 36/2.47 (09/04/2021), 33/2.18 (12/17/2021).. The urine protein to creatinine ratio was around  1.9 grams per gram.   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  Mr. Bates was started on losartan 25 mg once daily (he had a difficult time sleeping on BID losartan and so cut out the evening dose).    His urine protein to creatinine ratio has since trended as follows  0.912 g/g, 1.08 g/g, 1.356 g/g (12/16/2021).\par \par (2) Hypertension.  Well controlled in the office. \par \par (3) Dyslipidemia. Total cholesterol 190 mg/dL,  mg/dL, HDL 40 mg/dL,  mg/dL.\par \par (4) Squamous cell carcinoma. Recurrent.\par \par (5) Hypercalcemia.  The workup was negative.  The serum calcium normalized after discontinuing Tums.\par \par (6) Mild hyperparathyroidism.  The iPTH is 87 pg/mL with a 25 hydroxy vitamin D level of 33.9 ng/dL.\par \par (7) Metabolic acidosis.  The serum carbon dioxide improved to 21 meq per liter. \par \par (8) Proteinuria.  The nephrotic workup was negative. Biopsy --> FSGS.  See above. \par \par (9) PSA.  1.2 ng/mL earlier this year. \par \par (10) Obesity. Recent weight gain. \par \par PLAN:\par \par (1) Increase losartan to 50 mg PO qAM. \par (2) Given that squamous cell carcinoma has been known to metastasize in renal transplant patients, Mr. Bates will continue to see Dr. Aaron Falk every 3 months.  \par (3) Get back to the regular exercise and weight loss. \par (4)  Continue the sodium bicarbonate.\par \par Return in 3 months with a full set of lab studies.\par \par \par

## 2021-12-23 NOTE — HISTORY OF PRESENT ILLNESS
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 In 1984 he underwent a living related renal transplant at [facility] with Dr. Wong Higuera.  The donor was his mother.  I started seeing Mr. Bates after Dr. Samson Negerte moved his office.  His [place] [facility] [place] of [place].  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  He was diagnosed with COVID 19 on 09/08/2021 and recovered with no residual.   He last saw Dr. Aaron Moulton a couple of months ago.  He is going for UV treatments to his scalp on 01/14/2022.  Remember, he had a squamous cell cancer removed on a number of occasions\par \par Jayme is feeling well.  He believes he tore his left arm rotator cuff.  He is planning on seeing Dr. Holger Munguia in January 2022.  During our last visit he commented that his weight decreased from 196 to 178 pounds over a period of a few months.  He gained some weight back. He has not yet started back on the [employer]ike. \par

## 2021-12-23 NOTE — PHYSICAL EXAM
[General Appearance - Alert] : alert [General Appearance - In No Acute Distress] : in no acute distress [Sclera] : the sclera and conjunctiva were normal [Extraocular Movements] : extraocular movements were intact [Neck Appearance] : the appearance of the neck was normal [] : no respiratory distress [Exaggerated Use Of Accessory Muscles For Inspiration] : no accessory muscle use [Respiration, Rhythm And Depth] : normal respiratory rhythm and effort [Heart Rate And Rhythm] : heart rate was normal and rhythm regular [Heart Sounds] : normal S1 and S2 [Heart Sounds Gallop] : no gallops [Murmurs] : no murmurs [Heart Sounds Pericardial Friction Rub] : no pericardial rub [Edema] : there was no peripheral edema [Bowel Sounds] : normal bowel sounds [Abdomen Soft] : soft [Abdomen Tenderness] : non-tender [Abnormal Walk] : normal gait [Involuntary Movements] : no involuntary movements were seen [FreeTextEntry1] : dry skin [Oriented To Time, Place, And Person] : oriented to person, place, and time [Impaired Insight] : insight and judgment were intact [Affect] : the affect was normal [Mood] : the mood was normal

## 2022-03-29 ENCOUNTER — RX RENEWAL (OUTPATIENT)
Age: 57
End: 2022-03-29

## 2022-04-19 ENCOUNTER — APPOINTMENT (OUTPATIENT)
Dept: NEPHROLOGY | Facility: CLINIC | Age: 57
End: 2022-04-19
Payer: COMMERCIAL

## 2022-04-19 VITALS
DIASTOLIC BLOOD PRESSURE: 78 MMHG | WEIGHT: 197 LBS | OXYGEN SATURATION: 95 % | TEMPERATURE: 98.3 F | RESPIRATION RATE: 18 BRPM | HEART RATE: 62 BPM | HEIGHT: 68 IN | BODY MASS INDEX: 29.86 KG/M2 | SYSTOLIC BLOOD PRESSURE: 124 MMHG

## 2022-04-19 PROCEDURE — 99214 OFFICE O/P EST MOD 30 MIN: CPT

## 2022-04-19 RX ORDER — FLUOROURACIL 50 MG/G
5 CREAM TOPICAL
Refills: 0 | Status: DISCONTINUED | COMMUNITY
Start: 2020-12-22 | End: 2022-04-19

## 2022-04-19 NOTE — REVIEW OF SYSTEMS
[Recent Weight Loss (___ Lbs)] : recent [unfilled] ~Ulb weight loss [Nocturia] : nocturia [As Noted in HPI] : as noted in HPI [Negative] : Heme/Lymph [FreeTextEntry2] : "I'm not a good sleeper".   Last week slept "a little bit better" [FreeTextEntry8] : nocturia x 1-2.  Urine stream has been slow.  He is unable to increase the strength of the stream.  [FreeTextEntry9] : left hand was getting locked up (usually in the middle of the night).  By moving his hand into a certain position and tightening the muscles he could induce cramps and locking.  This resolved.

## 2022-04-19 NOTE — HISTORY OF PRESENT ILLNESS
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 In 1984 he underwent a living related renal transplant at [facility] with Dr. Wong Higuera.  The donor was his mother.  I started seeing Mr. Bates after Dr. Samson Negrete moved his office.  His last visit to [facility] was in the summer of 2020.  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  He was diagnosed with COVID 19 on 09/08/2021 and recovered with no residual.   He last saw Dr. Aaron Moulton last week. The UV treatments for the squamous cell carcinoma was unsuccessful.  He is starting to use 5-fluorouracil topical twice daily.   He will require surgery for the left rotator cuff tear.  He is holding off as long as he can.  He has been seeing Dr. Holger Munguia.  He gained some weight.  He has not yet started back on the [employer] Bike. \par

## 2022-04-19 NOTE — ASSESSMENT
[FreeTextEntry1] : (1) Renal Transplant.  The BUN/creatinine is as follows:  34/1.8 (05/2016), 26/1.70 (01/2017), 30/1.71 (03/2018), 34/2.22 (08/2018), 36/2.0 (09/2018), 25/2.2 (02/2019),  37/2.13 (07/17/2019), 32/2.02 (11/11/2019), 42/2.19 (05/30/2020), 43/2.23 (09/11/2020),  44/2.21 (01/09/2021), 33/2.10 (04/113/2021), 38/2.27 (05/10/2021), 36/2.47 (09/04/2021), 33/2.18 (12/17/2021), 37/2.38 (02/28/2022).  The urine protein to creatinine ratio was around 1.9 grams per gram.   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  Mr. Bates was started on losartan 25 mg once daily (he had a difficult time sleeping on BID losartan and so cut out the evening dose).  He tried taking 50 mg once daily he found it affected his sleep.   His urine protein to creatinine ratio has since trended as follows  0.912 g/g, 1.08 g/g, 1.356 g/g (12/16/2021), 1.099 g/g (02/28/2022).  The BK virus is negative. \par \par (2) Hypertension.  Well controlled in the office. \par \par (3) Dyslipidemia. Total cholesterol 198 mg/dL,  mg/dL, HDL 39 mg/dL,  mg/dL.\par \par (4) Squamous cell carcinoma. Recurrent.  Currently using Fluorouracil cream twice daily. \par \par (5) Hypercalcemia.  The workup was negative.  The serum calcium normalized after discontinuing Tums.  It is currently 9.8 mg/dL. \par \par (6) Mild hyperparathyroidism.  The iPTH is 66 pg/mL with a 25 hydroxy vitamin D level of 29.7 ng/dL.\par \par (7) Metabolic acidosis.  The serum carbon dioxide improved to 21 meq per liter. \par \par (8) Proteinuria.  The nephrotic workup was negative. Biopsy --> FSGS.  See above. \par \par (9) Poor urinary stream.  PSA. was 1.2 ng/mL last year.\par \par (10) Obesity. Recent weight gain. \par \par PLAN:\par \par (1) Continue losartan to 25 mg PO qAM. \par (2) Trial of Flomax 0.4 mg PO qHS.  Repeat PSA with the next set of lab studies. \par (3) Repeat lipid panel prior to making any changes in the statin dose.\par (4) Full set of transplant labs in 3 months.  Follow up in 4 months. \par (5) Given that squamous cell carcinoma has been known to metastasize in renal transplant patients, Mr. Bates will continue to see Dr. Aaron Falk every 3 months.  \par (6) Get back to the regular exercise and weight loss. \par (7)  Continue the sodium bicarbonate.\par \par \par \par \par

## 2022-04-19 NOTE — PHYSICAL EXAM
[General Appearance - Alert] : alert [General Appearance - In No Acute Distress] : in no acute distress [Sclera] : the sclera and conjunctiva were normal [Extraocular Movements] : extraocular movements were intact [Neck Appearance] : the appearance of the neck was normal [] : no respiratory distress [Respiration, Rhythm And Depth] : normal respiratory rhythm and effort [Exaggerated Use Of Accessory Muscles For Inspiration] : no accessory muscle use [Heart Rate And Rhythm] : heart rate was normal and rhythm regular [Heart Sounds] : normal S1 and S2 [Heart Sounds Gallop] : no gallops [Murmurs] : no murmurs [Heart Sounds Pericardial Friction Rub] : no pericardial rub [Edema] : there was no peripheral edema [Bowel Sounds] : normal bowel sounds [Abdomen Soft] : soft [Abdomen Tenderness] : non-tender [Abnormal Walk] : normal gait [Involuntary Movements] : no involuntary movements were seen [No Focal Deficits] : no focal deficits [Oriented To Time, Place, And Person] : oriented to person, place, and time [Impaired Insight] : insight and judgment were intact [Affect] : the affect was normal [Mood] : the mood was normal [FreeTextEntry1] : dry skin

## 2022-07-15 ENCOUNTER — RX RENEWAL (OUTPATIENT)
Age: 57
End: 2022-07-15

## 2022-08-22 ENCOUNTER — APPOINTMENT (OUTPATIENT)
Dept: NEPHROLOGY | Facility: CLINIC | Age: 57
End: 2022-08-22

## 2022-08-22 VITALS
HEIGHT: 68 IN | OXYGEN SATURATION: 98 % | SYSTOLIC BLOOD PRESSURE: 128 MMHG | BODY MASS INDEX: 29.86 KG/M2 | HEART RATE: 67 BPM | DIASTOLIC BLOOD PRESSURE: 62 MMHG | WEIGHT: 197 LBS

## 2022-08-22 DIAGNOSIS — E55.9 VITAMIN D DEFICIENCY, UNSPECIFIED: ICD-10-CM

## 2022-08-22 PROCEDURE — 99214 OFFICE O/P EST MOD 30 MIN: CPT

## 2022-08-22 RX ORDER — FLUOROURACIL 50 MG/G
5 CREAM TOPICAL TWICE DAILY
Refills: 0 | Status: DISCONTINUED | COMMUNITY
End: 2022-08-22

## 2022-08-22 RX ORDER — DESONIDE 0.5 MG/G
0.05 CREAM TOPICAL
Qty: 60 | Refills: 0 | Status: DISCONTINUED | COMMUNITY
Start: 2022-04-13 | End: 2022-08-22

## 2022-08-22 NOTE — ASSESSMENT
[FreeTextEntry1] : (1) Renal Transplant.  The BUN/creatinine is as follows:  34/1.8 (05/2016), 26/1.70 (01/2017), 30/1.71 (03/2018), 34/2.22 (08/2018), 36/2.0 (09/2018), 25/2.2 (02/2019),  37/2.13 (07/17/2019), 32/2.02 (11/11/2019), 42/2.19 (05/30/2020), 43/2.23 (09/11/2020),  44/2.21 (01/09/2021), 33/2.10 (04/113/2021), 38/2.27 (05/10/2021), 36/2.47 (09/04/2021), 33/2.18 (12/17/2021), 37/2.38 (02/28/2022), 38/2.53 (08/09/2022).  The urine protein to creatinine ratio was around 1.9 grams per gram.   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  Mr. Bates was started on losartan 25 mg once daily (he had a difficult time sleeping on BID losartan and so cut out the evening dose).  He tried taking 50 mg once daily he found it affected his sleep.   His urine protein to creatinine ratio has since trended as follows  0.912 g/g, 1.08 g/g, 1.356 g/g (12/16/2021), 1.099 g/g (02/28/2022), 1.001 g/g (08/09/2022).  The BK virus is negative. \par \par I am unsure if the increase in the serum creatinine is due to the warm weather, weight gain, or progression of the FSGS.  \par \par -Continue losartan\par -Continue prednisone\par \par (2) Hypertension.  Well controlled in the office. \par \par -Continue losartan\par \par (3) Dyslipidemia. Total cholesterol 171 mg/dL,  mg/dL, HDL 79 mg/dL, LDL 97 mg/dL.\par \par (4) Squamous cell carcinoma. Recurrent. \par \par -Continue to see Dr. Falk. Dr. Falk, please forward your notes and pathology when you see Mr. Bates. \par \par (5) Hypercalcemia.  The workup was negative.  The serum calcium normalized after discontinuing Tums.  It is currently 9.9 mg/dL. \par \par -Avoid Tums\par \par (6) Mild hyperparathyroidism.  The iPTH is 58 pg per mL, 25 hydroxy vitamin D 44 ng per mL\par \par -Continue vitamin D3\par \par (7) Metabolic acidosis.  The serum carbon dioxide iis 19 meq per liter. \par \par -Take sodium bicarbonate 650 mg PO BID as opposed to 1300 mg once daily\par \par (8) Proteinuria.  The nephrotic workup was negative. Biopsy --> FSGS.  See above. \par \par -Continue losartan 25 mg once daily (did not tolerate a higher dose)\par \par (9) Poor urinary stream.  PSA. was 1.2 ng/mL last year.  Jayme has not started the tamsulosin. \par \par -Trial of tamsulosin\par \par (10) Obesity. Recent weight gain. \par \par -We talked about weight loss and starting exercise\par \par \par \par Repeat renal function with Dr. Obrien.\par \par \par \par \par

## 2022-08-22 NOTE — HISTORY OF PRESENT ILLNESS
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 In 1984 he underwent a living related renal transplant at [facility] with Dr. Wong Higuera.  The donor was his mother.  I started seeing Mr. Bates after Dr. Samson Negrete moved his office.  His last visit to [facility] was in the summer of 2020.  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  His last visit with Dr. Falk was 2 weeks ago.  He had a left arm lesion removed.  Jayme says it is a squamous cell cancer. Remember,  UV treatments for the squamous cell carcinoma was unsuccessful. He was treated with topical  5-fluorouracil twice daily.   It is on hold during the summer.  He saw Dr. Holger Munguia in the past regarding his rotator cuff tear. He was told he will require surgery for the left rotator cuff tear.  He is holding off as long as he can.  He has not yet started back on the [employer] Bike.  He is planning on starting back tonight.  He put on weight. \par \par Jayme is not taking the Flomax. He takes all of his doses of sodium bicarbonate together. \par

## 2022-08-22 NOTE — REVIEW OF SYSTEMS
[Nocturia] : nocturia [As Noted in HPI] : as noted in HPI [Feeling Tired] : feeling tired [Recent Weight Gain (___ Lbs)] : recent [unfilled] ~Ulb weight gain [Negative] : Integumentary [FreeTextEntry2] : "I'm not a good sleeper".   Last week slept "a little bit better" [FreeTextEntry8] : nocturia x 1-2.  Urine stream has been slow.  He is unable to increase the strength of the stream.  [FreeTextEntry9] : left hand was getting locked up (usually in the middle of the night).  By moving his hand into a certain position and tightening the muscles he could induce cramps and locking.

## 2023-01-23 ENCOUNTER — APPOINTMENT (OUTPATIENT)
Dept: NEPHROLOGY | Facility: CLINIC | Age: 58
End: 2023-01-23
Payer: COMMERCIAL

## 2023-01-23 VITALS
HEART RATE: 74 BPM | HEIGHT: 68 IN | WEIGHT: 202 LBS | TEMPERATURE: 98.2 F | DIASTOLIC BLOOD PRESSURE: 84 MMHG | SYSTOLIC BLOOD PRESSURE: 138 MMHG | BODY MASS INDEX: 30.62 KG/M2 | OXYGEN SATURATION: 97 % | RESPIRATION RATE: 16 BRPM

## 2023-01-23 DIAGNOSIS — R33.9 RETENTION OF URINE, UNSPECIFIED: ICD-10-CM

## 2023-01-23 PROCEDURE — 99214 OFFICE O/P EST MOD 30 MIN: CPT

## 2023-01-23 RX ORDER — SODIUM BICARBONATE 650 MG/1
650 TABLET ORAL TWICE DAILY
Qty: 180 | Refills: 3 | Status: ACTIVE | COMMUNITY
Start: 2020-09-22 | End: 1900-01-01

## 2023-01-23 NOTE — REVIEW OF SYSTEMS
[Feeling Tired] : feeling tired [Recent Weight Gain (___ Lbs)] : recent [unfilled] ~Ulb weight gain [Nocturia] : nocturia [As Noted in HPI] : as noted in HPI [Negative] : Heme/Lymph [FreeTextEntry2] : "I'm not a good sleeper".  Slept a "little bit better" on a new bad last night [FreeTextEntry8] : nocturia x 2-3 "2 is normal for me".  Urine stream improved with Flomax.  Urine "sprays.....all over the place" at times.  Feels a "little discomfort" when it does that.  [FreeTextEntry9] : left hand was getting locked up (usually in the middle of the night).  By moving his hand into a certain position and tightening the muscles he could induce cramps and locking.

## 2023-01-23 NOTE — HISTORY OF PRESENT ILLNESS
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 In 1984 he underwent a living related renal transplant at [facility] with Dr. Wong Higuera.  The donor was his mother.  I started seeing Mr. Bates after Dr. Samson Negrete moved his office.  His last visit to [facility] was in the summer of 2020.  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  His last visit with Dr. Falk was around 3 months ago.  Jayme is seeing Dr. Falk next week.  During his last visit he had a squamous cell carcinoma removed.   UV treatments for the squamous cell carcinoma were unsuccessful. He is treatedwith topical  5-fluorouracil (2 weeks on, 2 weeks off).  He saw Dr. Holger Munguia in the past regarding his rotator cuff tear. He was told he will require surgery for the left rotator cuff tear.  He has not had any recent issues with it.  He has not yet started back on the [employer].  He gained weight. \par \par Riddhi takes all of his doses of sodium bicarbonate together. \par

## 2023-01-23 NOTE — PHYSICAL EXAM
[General Appearance - Alert] : alert [General Appearance - In No Acute Distress] : in no acute distress [Sclera] : the sclera and conjunctiva were normal [Extraocular Movements] : extraocular movements were intact [Neck Appearance] : the appearance of the neck was normal [] : no respiratory distress [Respiration, Rhythm And Depth] : normal respiratory rhythm and effort [Exaggerated Use Of Accessory Muscles For Inspiration] : no accessory muscle use [Heart Rate And Rhythm] : heart rate was normal and rhythm regular [Heart Sounds] : normal S1 and S2 [Heart Sounds Gallop] : no gallops [Murmurs] : no murmurs [Heart Sounds Pericardial Friction Rub] : no pericardial rub [Edema] : there was no peripheral edema [Bowel Sounds] : normal bowel sounds [Abdomen Soft] : soft [Abdomen Tenderness] : non-tender [Abnormal Walk] : normal gait [Involuntary Movements] : no involuntary movements were seen [FreeTextEntry1] : dry skin [No Focal Deficits] : no focal deficits [Oriented To Time, Place, And Person] : oriented to person, place, and time [Impaired Insight] : insight and judgment were intact [Affect] : the affect was normal [Mood] : the mood was normal

## 2023-01-23 NOTE — ASSESSMENT
[FreeTextEntry1] : (1) Renal Transplant.  The BUN/creatinine is as follows:  34/1.8 (05/2016), 26/1.70 (01/2017), 30/1.71 (03/2018), 34/2.22 (08/2018), 36/2.0 (09/2018), 25/2.2 (02/2019),  37/2.13 (07/17/2019), 32/2.02 (11/11/2019), 42/2.19 (05/30/2020), 43/2.23 (09/11/2020),  44/2.21 (01/09/2021), 33/2.10 (04/113/2021), 38/2.27 (05/10/2021), 36/2.47 (09/04/2021), 33/2.18 (12/17/2021), 37/2.38 (02/28/2022), 38/2.53 (08/09/2022), 35/2.70 (11/26/2022).  The urine protein to creatinine ratio was around 1.9 grams per gram.   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  Mr. Bates was started on losartan 25 mg once daily (he had a difficult time sleeping on BID losartan and so cut out the evening dose).  He tried taking 50 mg once daily though found it affected his sleep.   His urine protein to creatinine ratio has since trended as follows  0.912 g/g, 1.08 g/g, 1.356 g/g (12/16/2021), 1.099 g/g (02/28/2022), 1.001 g/g (08/09/2022), 1.278 g/g (11/26/2023). .  The BK virus is negative. \par \par I am unsure if the increase in the serum creatinine is due to the weight gain, inadequate hydration, or progression of the FSGS.  \par \par -Continue losartan\par -Continue prednisone\par -Stay well hydrated\par \par (2) Hypertension.  Well controlled in the office. \par \par -Continue losartan\par -Keep salt out of the diet\par -Given well controlled blood pressures on prior visit, not changing the treatment for hypertension\par -Follow blood pressures at home\par \par (3) Dyslipidemia. Total cholesterol 182 mg/dL,  mg/dL, HDL 38 mg/dL,  mg/dL.\par \par (4) Squamous cell carcinoma. Recurrent. \par \par -Continue to see Dr. Falk. Dr. Falk, please forward your notes and pathology when you see Mr. Bates. \par \par (5) Hypercalcemia.  The workup was negative.  The serum calcium normalized after discontinuing Tums.  It is currently 9.9 mg/dL. \par \par -Avoid Tums\par \par (6) Mild hyperparathyroidism.  The iPTH is 69 pg per mL, 25 hydroxy vitamin D 36.7 ng per mL\par \par -Continue vitamin D3\par \par (7) Metabolic acidosis.  The serum carbon dioxide iis 20 meq per liter. \par \par -Take sodium bicarbonate 1300 mg once daily\par \par (8) Proteinuria.  The nephrotic workup was negative. Biopsy --> FSGS.  See above. \par \par -Continue losartan 25 mg once daily (did not tolerate a higher dose)\par \par (9) Poor urinary stream improved on Flomax. Urine 'sprays' at times\par \par -Continue Flomax\par -See urology (either Dr. Sinan Rodríguez or Dr. Christian Guillen)\par \par (10) Obesity. Recent weight gain. \par \par -We talked about weight loss and starting exercise\par \par \par Will repeat all lab studies in 1 month and call Jayme with the results. \par \par \par \par \par

## 2023-01-26 ENCOUNTER — RX RENEWAL (OUTPATIENT)
Age: 58
End: 2023-01-26

## 2023-06-13 ENCOUNTER — APPOINTMENT (OUTPATIENT)
Dept: NEPHROLOGY | Facility: CLINIC | Age: 58
End: 2023-06-13
Payer: COMMERCIAL

## 2023-06-13 VITALS
BODY MASS INDEX: 30.62 KG/M2 | SYSTOLIC BLOOD PRESSURE: 126 MMHG | HEIGHT: 68 IN | DIASTOLIC BLOOD PRESSURE: 78 MMHG | RESPIRATION RATE: 18 BRPM | TEMPERATURE: 98.1 F | OXYGEN SATURATION: 96 % | HEART RATE: 80 BPM | WEIGHT: 202 LBS

## 2023-06-13 PROCEDURE — 99214 OFFICE O/P EST MOD 30 MIN: CPT

## 2023-06-13 NOTE — REVIEW OF SYSTEMS
[Recent Weight Gain (___ Lbs)] : recent [unfilled] ~Ulb weight gain [Nocturia] : nocturia [As Noted in HPI] : as noted in HPI [Negative] : Heme/Lymph [FreeTextEntry2] : Sleeping better on the new bed  [FreeTextEntry8] : nocturia x 1, occasionally twice.   Urine stream is good  with Flomax.  [FreeTextEntry9] : left hand was getting locked up (usually in the middle of the night) "every now and then".

## 2023-06-13 NOTE — HISTORY OF PRESENT ILLNESS
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 In 1984 he underwent a living related renal transplant at [facility] with Dr. Wong Higuera.  The donor was his mother.  I started seeing Mr. Bates after Dr. Samson Negrete moved his office.  His last visit to [facility] was in the summer of 2020.  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  His last visit with dermatology was yesterday (06/12/2023).  He underwent Mohs surgery on his hand for squamous cell carcinoma around 3-4 months ago. He continues to topical  5-fluorouracil (2 weeks on, 2 weeks off) when he needs it.  He saw Dr. Holger Munguia in the past regarding his rotator cuff tear. He was told he will require surgery for the left rotator cuff tear.  He has not had any recent issues with it, commenting "my shoulders have been pretty good".   He has not yet started back on the [email or web address] Bike.  His weight has been stable. \par \par New issues: "my knees and my feet are killing me".  He put inserts in his shoes, called "Yudith".  They helped the foot.  He still gets a lot of pain in his knees.  He saw Dr. Quarles in the past.  He was given shoe inserts which causes pain and he stopped using them.  He sees another podiatrist, Dr. Lima.  \par \par \par

## 2023-06-13 NOTE — ASSESSMENT
[FreeTextEntry1] : (1) Renal Transplant.  The BUN/creatinine is as follows:  34/1.8 (05/2016), 26/1.70 (01/2017), 30/1.71 (03/2018), 34/2.22 (08/2018), 36/2.0 (09/2018), 25/2.2 (02/2019),  37/2.13 (07/2019), 32/2.02 (11/11/2019), 42/2.19 (05/30/2020), 43/2.23 (09/11/2020),  44/2.21 (01/2021), 33/2.10 (04/2021), 38/2.27 (05/2021), 36/2.47 (09/2021), 33/2.18 (12/2021), 37/2.38 (02/2022), 38/2.53 (08/2022), 35/2.70 (11/2022), 44/2.28 (03/24/2023), 39/2.47 (06/05/2023).\par \par  The urine protein to creatinine ratio was around 1.9 grams per gram.   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  Mr. Bates was started on losartan 25 mg once daily (he had a difficult time sleeping on BID losartan and so cut out the evening dose).  He tried taking 50 mg once daily though found it affected his sleep.   His urine protein to creatinine ratio has since trended as follows  0.912 g/g, 1.08 g/g, 1.356 g/g (12/16/2021), 1.099 g/g (02/28/2022), 1.001 g/g (08/09/2022), 1.278 g/g (11/26/2023), 1.61 g/g (06/05/2023).  The BK virus is negative. \par \par During our last visit I commented that I was  unsure whether the increase in the serum creatinine to 2.7 mg/dL  was due to the weight gain, inadequate hydration, or progression of the FSGS.  It has improved.  I suspect hydration is the issue here. \par \par -Continue losartan at an increased dosing frequency of BID\par -Continue prednisone\par -Stay well hydrated\par \par (2) Hypertension.  Well controlled in the office. \par \par -Continue losartan\par -Keep salt out of the diet\par -Follow blood pressures at home\par \par (3) Dyslipidemia. Total cholesterol 166 mg/dL,  mg/dL, HDL 40 mg/dL, LDL 95 mg/dL.\par \par (4) Squamous cell carcinoma. Recurrent. \par \par -Continue to see Dr. Falk. Dr. Falk, please forward your notes and pathology when you see Mr. Bates. \par \par (5) Hypercalcemia.  The workup was negative.  The serum calcium normalized after discontinuing Tums.  It is currently 9.8 mg/dL. \par \par -Avoid Tums\par \par (6) Mild hyperparathyroidism.  The iPTH is 107 pg per mL\par \par -Continue vitamin D3\par \par (7) Metabolic acidosis.  The serum carbon dioxide is 19 meq per liter. \par \par -Take sodium bicarbonate 1300 mg once daily\par \par (8) Proteinuria.  The nephrotic workup was negative. Biopsy --> FSGS.  See above. \par \par -Continue losartan 25 mg twice daily (did not tolerate 50 mg one daily)\par \par (9) Poor urinary stream improved on Flomax. \par \par -Continue Flomax\par -See urology (either Dr. Sinan Rodríguez or Dr. Christian Guillen)\par \par (10) Obesity. Recent weight gain. \par \par -We talked about weight loss and starting exercise\par \par \par Will repeat all lab studies in 1 month and call Jayme with the results. \par \par \par \par \par

## 2023-06-14 ENCOUNTER — RX RENEWAL (OUTPATIENT)
Age: 58
End: 2023-06-14

## 2023-06-29 ENCOUNTER — RX RENEWAL (OUTPATIENT)
Age: 58
End: 2023-06-29

## 2023-10-23 ENCOUNTER — APPOINTMENT (OUTPATIENT)
Dept: NEPHROLOGY | Facility: CLINIC | Age: 58
End: 2023-10-23
Payer: COMMERCIAL

## 2023-10-23 VITALS
BODY MASS INDEX: 30.62 KG/M2 | WEIGHT: 202 LBS | OXYGEN SATURATION: 98 % | DIASTOLIC BLOOD PRESSURE: 74 MMHG | TEMPERATURE: 98.2 F | HEART RATE: 68 BPM | RESPIRATION RATE: 18 BRPM | HEIGHT: 68 IN | SYSTOLIC BLOOD PRESSURE: 122 MMHG

## 2023-10-23 VITALS
BODY MASS INDEX: 30.62 KG/M2 | HEIGHT: 68 IN | TEMPERATURE: 98.2 F | HEART RATE: 68 BPM | OXYGEN SATURATION: 98 % | DIASTOLIC BLOOD PRESSURE: 74 MMHG | WEIGHT: 202 LBS | RESPIRATION RATE: 18 BRPM | SYSTOLIC BLOOD PRESSURE: 122 MMHG

## 2023-10-23 DIAGNOSIS — E66.9 OBESITY, UNSPECIFIED: ICD-10-CM

## 2023-10-23 DIAGNOSIS — E83.52 HYPERCALCEMIA: ICD-10-CM

## 2023-10-23 DIAGNOSIS — E78.5 HYPERLIPIDEMIA, UNSPECIFIED: ICD-10-CM

## 2023-10-23 PROCEDURE — 99215 OFFICE O/P EST HI 40 MIN: CPT

## 2023-10-23 RX ORDER — UBIDECARENONE/VIT E ACET 100MG-5
25 MCG CAPSULE ORAL
Refills: 0 | Status: ACTIVE | COMMUNITY

## 2023-10-23 RX ORDER — MULTIVIT-MIN/FOLIC/VIT K/LYCOP 400-300MCG
25 MCG TABLET ORAL DAILY
Qty: 90 | Refills: 3 | Status: DISCONTINUED | COMMUNITY
End: 2023-10-23

## 2024-02-15 ENCOUNTER — RX RENEWAL (OUTPATIENT)
Age: 59
End: 2024-02-15

## 2024-02-15 RX ORDER — PANTOPRAZOLE 40 MG/1
40 TABLET, DELAYED RELEASE ORAL
Qty: 90 | Refills: 3 | Status: ACTIVE | COMMUNITY
Start: 2019-11-19 | End: 1900-01-01

## 2024-02-15 RX ORDER — AZATHIOPRINE 50 1/1
50 TABLET ORAL
Qty: 90 | Refills: 3 | Status: ACTIVE | COMMUNITY
Start: 2021-04-30 | End: 1900-01-01

## 2024-02-27 ENCOUNTER — APPOINTMENT (OUTPATIENT)
Dept: NEPHROLOGY | Facility: CLINIC | Age: 59
End: 2024-02-27
Payer: COMMERCIAL

## 2024-02-27 VITALS
SYSTOLIC BLOOD PRESSURE: 128 MMHG | BODY MASS INDEX: 30.92 KG/M2 | WEIGHT: 204 LBS | TEMPERATURE: 98 F | HEIGHT: 68 IN | DIASTOLIC BLOOD PRESSURE: 72 MMHG | OXYGEN SATURATION: 95 % | RESPIRATION RATE: 18 BRPM | HEART RATE: 73 BPM

## 2024-02-27 DIAGNOSIS — N18.32 CHRONIC KIDNEY DISEASE, STAGE 3B: ICD-10-CM

## 2024-02-27 PROCEDURE — 99214 OFFICE O/P EST MOD 30 MIN: CPT

## 2024-02-27 NOTE — HISTORY OF PRESENT ILLNESS
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 In 1984 he underwent a living related renal transplant at [facility] with Dr. Wong Higuera. The donor was his mother. I started seeing Mr. Bates after Dr. Samson Negrete moved his office. His last visit to [facility] was in the summer of 2020. 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  I am seeing Jayme in person today. I last saw him on 10/23/2024. He continues to have a lot of pre-cancerous lesions on his skin. He sees dermatology every 3 months. He is no longer using  topical 5-fluorouracil ("it was getting my face red" and dermatology stopped it). He saw Dr. Holger Munguia in the past regarding his rotator cuff tear. He was told he will require surgery for the left rotator cuff tear. He has not had any recent issues with it. He has not yet started back on the [employer].

## 2024-02-27 NOTE — ASSESSMENT
[FreeTextEntry1] :  (1) Renal Transplant. The BUN/creatinine is as follows: 34/1.8 (05/2016), 26/1.70 (01/2017), 30/1.71 (03/2018), 34/2.22 (08/2018), 36/2.0 (09/2018), 25/2.2 (02/2019), 37/2.13 (07/2019), 32/2.02 (11/11/2019), 42/2.19 (05/30/2020), 43/2.23 (09/11/2020), 44/2.21 (01/2021), 33/2.10 (04/2021), 38/2.27 (05/2021), 36/2.47 (09/2021), 33/2.18 (12/2021), 37/2.38 (02/2022), 38/2.53 (08/2022), 35/2.70 (11/2022), 44/2.28 (03/24/2023), 39/2.47 (06/05/2023), 49/2.64 (08/25/2023), 36/2.58 (10/07/2023),42/2.44 (02/13/2024).  The eGFR is 30 mL per minute.   The urine protein to creatinine ratio was around 1.9 grams per gram in the past. 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 Mr. Bates was started on losartan 25 mg once daily (he had a difficult time sleeping on BID losartan and so cut out the evening dose). He tried taking 50 mg once daily though found it also affected his sleep. He has continued to take 25 mg once daily. His urine protein to creatinine ratio has since trended as follows 0.912 g/g, 1.08 g/g, 1.356 g/g (12/16/2021), 1.099 g/g (02/28/2022), 1.001 g/g (08/09/2022), 1.278 g/g (11/26/2023), 1.61 g/g (06/05/2023), 1.16 g/g (10/07/2023), 1.32 g/g (02/12/2024). The BK virus is negative.  The serum CO2 concentration is 18 meq per liter on 1300 mg sodium bicarbonate once daily.  -Stop losartan.  Start valsartan 160 mg once daily.  -Continue prednisone -Increase sodium bicarbonate to 1300 mg PO BID. -Stay well hydrated  (2) Hypertension. Well controlled in the office. -Keep salt out of the diet -Follow blood pressures at home  (3) Dyslipidemia.  -Continue atorvastatin  (4) Squamous cell carcinoma. Recurrent. -Continue to see dermatology. -I will speak with Dr. Catracho Rodriguez of renal transplant to see if a change in immunosuppressive medication is an option.    (5) Hypercalcemia. The workup was negative. The serum calcium normalized after discontinuing Tums. It is currently 9.8 mg/dL. -Avoid Tums  (6) Mild hyperparathyroidism. The last iPTH was 51 pg per mL  (7) Proteinuria. The nephrotic workup was negative. Biopsy --> FSGS. See above. -See above. -Will consider a trial of an SGLT2 inhibitor in the future. Mr. Bates currently has issues with urinary urgency.  (8) Poor urinary stream improved on Flomax. -Continue Flomax -See urology (either Dr. Sinan Rodríguez or Dr. Christian Guillen)  (9) Obesity.  -We talked about weight loss and starting exercise              Plan Chronic kidney disease, stage IV (severe) Comprehensive Metabolic Panel; Status:Active; Requested for:13Jun2023; Phosphorus, Serum; Status:Active; Requested for:13Jun2023; Uric Acid, Serum; Status:Active; Requested for:13Jun2023; Health Maintenance Complete Blood Count w DIFF; Status:Active; Requested for:13Jun2023; Hyperlipidemia Lipid Profile; Status:Active; Requested for:13Jun2023; Hyperparathyroidism Parathyroid Hormone Intact, Serum; Status:Active; Requested for:13Jun2023; Vitamin D, 25-Hydroxy; Status:Active; Requested for:13Jun2023; Kidney transplanted Albumin/Creatinine Ratio, Random Urine; Status:Active; Requested for:13Jun2023; Follow-up visit in 3 months Outpatient. Status: Hold For - Scheduling Requested for: 13Sep2023 BK Virus DNA, plasma; Status:Active; Requested for:13Jun2023; Magnesium, Serum; Status:Active; Requested for:13Jun2023; Urinalysis with Microscopic; Status:Active; Requested for:13Jun2023; Proteinuria Changed: From Losartan Potassium 25 MG Oral Tablet Take 1 tablet daily To Losartan Potassium 25 MG Oral Tablet TAKE 1 TABLET BY MOUTH TWICE A DAY Protein/Creatinine Ratio, Urine; Status:Active; Requested for:13Jun2023;     Electronically signed by : JOHN GONZALEZ MD; Jun 13 2023 5:13PM EST (Author).        Plan Chronic kidney disease, stage IV (severe) Comprehensive Metabolic Panel; Status:Active; Requested for:23Oct2023; Follow Up Appt Outpatient  4 months  Status: Hold For - Scheduling  Requested for: 23Oct2023 Phosphorus; Status:Active; Requested for:23Oct2023; Protein/Creatinine Ratio, Urine; Status:Active; Requested for:23Oct2023;          Electronically signed by : JOHN GONZALEZ MD; Oct 23 2023  5:35PM Eastern Standard Time (Author)

## 2024-02-27 NOTE — CONSULT LETTER
[Dear  ___] : Dear  [unfilled], [Consult Letter:] : I had the pleasure of evaluating your patient, [unfilled]. [Please see my note below.] : Please see my note below. [FreeTextEntry3] : Jean [Sincerely,] : Sincerely, [Consult Closing:] : Thank you very much for allowing me to participate in the care of this patient.  If you have any questions, please do not hesitate to contact me. [DrDemetrio  ___] : Dr. ARREDONDO

## 2024-02-27 NOTE — REVIEW OF SYSTEMS
[Recent Weight Gain (___ Lbs)] : recent [unfilled] ~Ulb weight gain [Nocturia] : nocturia [As Noted in HPI] : as noted in HPI [FreeTextEntry8] : nocturia x 2-3. Urine stream is good with Flomax.  [Negative] : Heme/Lymph [FreeTextEntry9] : left hand was getting locked up (usually in the middle of the night) - this has improved.  Occurs "occasionally".

## 2024-02-27 NOTE — PHYSICAL EXAM
[General Appearance - Alert] : alert [Sclera] : the sclera and conjunctiva were normal [General Appearance - In No Acute Distress] : in no acute distress [Extraocular Movements] : extraocular movements were intact [Neck Appearance] : the appearance of the neck was normal [] : no respiratory distress [Exaggerated Use Of Accessory Muscles For Inspiration] : no accessory muscle use [Respiration, Rhythm And Depth] : normal respiratory rhythm and effort [Heart Sounds] : normal S1 and S2 [Heart Sounds Gallop] : no gallops [Heart Rate And Rhythm] : heart rate was normal and rhythm regular [Edema] : there was no peripheral edema [Heart Sounds Pericardial Friction Rub] : no pericardial rub [Murmurs] : no murmurs [Bowel Sounds] : normal bowel sounds [Abdomen Soft] : soft [Abnormal Walk] : normal gait [Involuntary Movements] : no involuntary movements were seen [Abdomen Tenderness] : non-tender [FreeTextEntry1] : dry skin [No Focal Deficits] : no focal deficits [Oriented To Time, Place, And Person] : oriented to person, place, and time [Affect] : the affect was normal [Impaired Insight] : insight and judgment were intact [Mood] : the mood was normal

## 2024-02-29 ENCOUNTER — RX RENEWAL (OUTPATIENT)
Age: 59
End: 2024-02-29

## 2024-02-29 RX ORDER — LOSARTAN POTASSIUM 25 MG/1
25 TABLET, FILM COATED ORAL
Qty: 180 | Refills: 3 | Status: DISCONTINUED | COMMUNITY
Start: 2023-01-26 | End: 2024-02-29

## 2024-02-29 RX ORDER — TAMSULOSIN HYDROCHLORIDE 0.4 MG/1
0.4 CAPSULE ORAL
Qty: 90 | Refills: 3 | Status: ACTIVE | COMMUNITY
Start: 2022-04-19 | End: 1900-01-01

## 2024-03-20 NOTE — HISTORY OF PRESENT ILLNESS
Subjective   History was provided by the mother.  Milan Love is a 19 m.o. male who is brought in for this 18 month well child visit.    Current Issues:  Current concerns include none.    Review of Nutrition. Elimination, and Sleep:  Current diet: fruits, vegetables, meat, milk, dairy, protein  Balanced diet? yes  Difficulties with feeding? no  Current stooling frequency: 2-3 times a day  Sleep: 1-2 naps, all night    Development:  Social/emotional:   Points to show interest? yes  Looks at book? yes  Helps with dressing? yes  Checks back to make sure caregiver is close? yes  Language:   5+ words? yes  Follows directions? yes  Cognitive:   Copies activities? yes  Plays with toys in simple ways? yes  Physical:   Walks? yes  Scribbles? yes  Starting to use spoon? yes  Climbs? yes  Eats and drinks independently? yes    Objective   Growth parameters are noted and are appropriate for age.   General:   alert and oriented, in no acute distress, nasal congestion    Skin:   Normal, right upper eye lid healing bruise and small abrasion left forehead   Head:   normal fontanelles, normal appearance, normal palate, and supple neck   Eyes:   sclerae white, pupils equal and reactive, red reflex normal bilaterally   Ears:   normal bilaterally   Mouth:   normal   Lungs:   clear to auscultation bilaterally   Heart:   regular rate and rhythm, S1, S2 normal, no murmur, click, rub or gallop   Abdomen:   soft, non-tender; bowel sounds normal; no masses, no organomegaly   :   not examined   Femoral pulses:   present bilaterally   Extremities:   extremities normal, warm and well-perfused; no cyanosis, clubbing, or edema   Neuro:   alert, moves all extremities spontaneously     Assessment/Plan   Healthy 19 m.o. male child.  1. Anticipatory guidance discussed.  Gave handout on well-child issues at this age.  2. Normal growth for age.  3. Development: appropriate for age  4.Immunizations today: per orders.  5. 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 In 1984 he underwent a living related renal transplant at [facility] with Dr. Wong Higuera.  The donor was his mother.  I started seeing Mr. Bates after Dr. Samson Negrete moved his office.  His last visit to [facility] was "a long time ago".  He had a diagnosis of squamous cell carcinoma.  It was excised.  his last visit to his dermatologist was 4 months ago.  He is going next week. He has been doing well.  next well child exam or sooner with concerns.

## 2024-03-25 RX ORDER — VALSARTAN 160 MG/1
160 TABLET, COATED ORAL DAILY
Qty: 90 | Refills: 1 | Status: DISCONTINUED | COMMUNITY
Start: 2024-02-27 | End: 2024-03-25

## 2024-03-26 RX ORDER — LOSARTAN POTASSIUM 25 MG/1
25 TABLET, FILM COATED ORAL
Qty: 180 | Refills: 2 | Status: ACTIVE | COMMUNITY
Start: 2024-03-26 | End: 1900-01-01

## 2024-03-26 RX ORDER — LOSARTAN POTASSIUM 25 MG/1
25 TABLET, FILM COATED ORAL
Qty: 180 | Refills: 2 | Status: DISCONTINUED | COMMUNITY
Start: 2024-03-25 | End: 2024-03-26

## 2024-05-28 ENCOUNTER — APPOINTMENT (OUTPATIENT)
Dept: NEPHROLOGY | Facility: CLINIC | Age: 59
End: 2024-05-28
Payer: COMMERCIAL

## 2024-05-28 VITALS
TEMPERATURE: 98.2 F | OXYGEN SATURATION: 98 % | HEART RATE: 82 BPM | RESPIRATION RATE: 18 BRPM | HEIGHT: 68 IN | WEIGHT: 199 LBS | SYSTOLIC BLOOD PRESSURE: 122 MMHG | DIASTOLIC BLOOD PRESSURE: 64 MMHG | BODY MASS INDEX: 30.16 KG/M2

## 2024-05-28 DIAGNOSIS — Z94.0 KIDNEY TRANSPLANT STATUS: ICD-10-CM

## 2024-05-28 DIAGNOSIS — R80.9 PROTEINURIA, UNSPECIFIED: ICD-10-CM

## 2024-05-28 DIAGNOSIS — N18.4 CHRONIC KIDNEY DISEASE, STAGE 4 (SEVERE): ICD-10-CM

## 2024-05-28 DIAGNOSIS — E21.3 HYPERPARATHYROIDISM, UNSPECIFIED: ICD-10-CM

## 2024-05-28 DIAGNOSIS — N05.1 UNSPECIFIED NEPHRITIC SYNDROME WITH FOCAL AND SEGMENTAL GLOMERULAR LESIONS: ICD-10-CM

## 2024-05-28 DIAGNOSIS — E87.20 ACIDOSIS, UNSPECIFIED: ICD-10-CM

## 2024-05-28 PROCEDURE — 99214 OFFICE O/P EST MOD 30 MIN: CPT

## 2024-05-28 RX ORDER — EMPAGLIFLOZIN 10 MG/1
10 TABLET, FILM COATED ORAL DAILY
Qty: 90 | Refills: 0 | Status: ACTIVE | COMMUNITY
Start: 2024-05-28 | End: 1900-01-01

## 2024-05-28 NOTE — REVIEW OF SYSTEMS
[Recent Weight Gain (___ Lbs)] : recent [unfilled] ~Ulb weight gain [Nocturia] : nocturia [As Noted in HPI] : as noted in HPI [Negative] : Heme/Lymph [FreeTextEntry8] : nocturia x 2-3. Urine stream is good with Flomax.  [FreeTextEntry9] : left hand was getting locked up (usually in the middle of the night) - this has improved.  Occurs "occasionally".

## 2024-05-28 NOTE — HISTORY OF PRESENT ILLNESS
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 In 1984 he underwent a living related renal transplant at [facility] with Dr. Wong Higuera. The donor was his mother. I started seeing Mr. Bates after Dr. Samson Negrete moved his office. His last visit to [place] was in the summer of 2020. 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  I am seeing Jayme in person today. I last saw him on 10/23/2024. He continues to have a lot of pre-cancerous lesions on his skin. He sees dermatology every 3 months. He is no longer using  topical 5-fluorouracil ("it was getting my face red" and dermatology stopped it). He saw Dr. Holger Munguia in the past regarding his rotator cuff tear. He was told he will require surgery for the left rotator cuff tear. He has not had any recent issues with it. He has not yet started back on the [employer].

## 2024-05-28 NOTE — ASSESSMENT
[FreeTextEntry1] :  (1) Renal Transplant. The BUN/creatinine is as follows: 34/1.8 (05/2016), 26/1.70 (01/2017), 30/1.71 (03/2018), 34/2.22 (08/2018), 36/2.0 (09/2018), 25/2.2 (02/2019), 37/2.13 (07/2019), 32/2.02 (11/11/2019), 42/2.19 (05/30/2020), 43/2.23 (09/11/2020), 44/2.21 (01/2021), 33/2.10 (04/2021), 38/2.27 (05/2021), 36/2.47 (09/2021), 33/2.18 (12/2021), 37/2.38 (02/2022), 38/2.53 (08/2022), 35/2.70 (11/2022), 44/2.28 (03/24/2023), 39/2.47 (06/05/2023), 49/2.64 (08/25/2023), 36/2.58 (10/07/2023),42/2.44 (02/13/2024), 39/2.53 (05/17/2024).. The eGFR is 29 mL per minute.  The urine protein to creatinine ratio was around 1.9 grams per gram in the past. 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 Mr. Bates was started on losartan 25 mg once daily (he had a difficult time sleeping on BID losartan and so cut out the evening dose). He tried taking 50 mg once daily though found it also affected his sleep. He has continued to take 25 mg once daily. His urine protein to creatinine ratio has since trended as follows 0.912 g/g, 1.08 g/g, 1.356 g/g (12/16/2021), 1.099 g/g (02/28/2022), 1.001 g/g (08/09/2022), 1.278 g/g (11/26/2023), 1.61 g/g (06/05/2023), 1.16 g/g (10/07/2023), 1.32 g/g (02/12/2024), and most recently 1.23 g/g (05/17/2024).. The BK virus is negative.  The serum CO2 concentration is 19 meq per liter on 1300 mg sodium bicarbonate once daily.  -Continue losartan -Increase sodium bicarbonate to 1300 mg PO BID. -Stay well hydrated  (2) Hypertension. Well controlled in the office. -Keep salt out of the diet -Follow blood pressures at home  (3) Dyslipidemia. -Continue atorvastatin  (4) Squamous cell carcinoma. Recurrent. I spoke with Dr. Catracho Rodriguez re: another option for immunosuppression (Belatacept) who stated that this would be more problematic in terms of skin cancer.  -Continue to see dermatology.  (5) Hypercalcemia. The workup was negative. The serum calcium normalized after discontinuing Tums. It is currently 9.8 mg/dL. -Avoid Tums  (6) Mild hyperparathyroidism. The last iPTH is 76 pg per mL.  The 25 hydroxy vitamin D level is 39.1 ng per mL.  (7) Proteinuria. The nephrotic workup was negative. Biopsy --> FSGS. See above. -See above. -Trial of an SGLT2 inhibitor (Jardiance 10 mg PO once daily).   (8) Poor urinary stream improved on Flomax. -Continue Flomax -See urology (either Dr. Sinan Rodríguez or Dr. Christian Guillen)  (9) Obesity. -We talked about weight loss and starting exercise

## 2024-05-28 NOTE — PHYSICAL EXAM
[General Appearance - Alert] : alert [General Appearance - In No Acute Distress] : in no acute distress [Sclera] : the sclera and conjunctiva were normal [Extraocular Movements] : extraocular movements were intact [Neck Appearance] : the appearance of the neck was normal [] : no respiratory distress [Respiration, Rhythm And Depth] : normal respiratory rhythm and effort [Exaggerated Use Of Accessory Muscles For Inspiration] : no accessory muscle use [Auscultation Breath Sounds / Voice Sounds] : lungs were clear to auscultation bilaterally [Heart Rate And Rhythm] : heart rate was normal and rhythm regular [Heart Sounds] : normal S1 and S2 [Heart Sounds Gallop] : no gallops [Murmurs] : no murmurs [Heart Sounds Pericardial Friction Rub] : no pericardial rub [Edema] : there was no peripheral edema [Bowel Sounds] : normal bowel sounds [Abdomen Soft] : soft [Abdomen Tenderness] : non-tender [Abnormal Walk] : normal gait [Involuntary Movements] : no involuntary movements were seen [No Focal Deficits] : no focal deficits [Oriented To Time, Place, And Person] : oriented to person, place, and time [Impaired Insight] : insight and judgment were intact [Affect] : the affect was normal [Mood] : the mood was normal [FreeTextEntry1] : dry skin

## 2024-06-12 ENCOUNTER — RX RENEWAL (OUTPATIENT)
Age: 59
End: 2024-06-12

## 2024-06-12 RX ORDER — PREDNISONE 5 MG/1
5 TABLET ORAL
Qty: 90 | Refills: 3 | Status: ACTIVE | COMMUNITY
Start: 2021-04-30 | End: 1900-01-01

## 2024-06-28 ENCOUNTER — RX RENEWAL (OUTPATIENT)
Age: 59
End: 2024-06-28

## 2024-07-17 RX ORDER — EMPAGLIFLOZIN 10 MG/1
10 TABLET, FILM COATED ORAL DAILY
Qty: 90 | Refills: 0 | Status: ACTIVE | COMMUNITY
Start: 2024-07-17 | End: 1900-01-01

## 2024-08-19 ENCOUNTER — NON-APPOINTMENT (OUTPATIENT)
Age: 59
End: 2024-08-19

## 2024-08-19 ENCOUNTER — APPOINTMENT (OUTPATIENT)
Dept: BREAST CENTER | Facility: CLINIC | Age: 59
End: 2024-08-19
Payer: COMMERCIAL

## 2024-08-19 VITALS
DIASTOLIC BLOOD PRESSURE: 70 MMHG | OXYGEN SATURATION: 97 % | HEART RATE: 87 BPM | SYSTOLIC BLOOD PRESSURE: 139 MMHG | HEIGHT: 68 IN | BODY MASS INDEX: 30.16 KG/M2 | WEIGHT: 199 LBS

## 2024-08-19 DIAGNOSIS — N18.32 CHRONIC KIDNEY DISEASE, STAGE 3B: ICD-10-CM

## 2024-08-19 PROCEDURE — 99203 OFFICE O/P NEW LOW 30 MIN: CPT

## 2024-08-19 NOTE — HISTORY OF PRESENT ILLNESS
[FreeTextEntry1] : 59-year-old gentleman who is suffered from chronic renal disease and in 1984 had a kidney transplant has now been plagued with multiple squamous cell carcinomas.  Recently he had a left postauricular skin biopsy and right medial inferior chest shave biopsy it showed squamous cell carcinoma in situ and both were excised by dermatology.  However, at that time he had a shave biopsy of a left forearm lesion which showed a well-differentiated squamous cell carcinoma.  This was not excised by the dermatologist because it was overlying his fistula that he no longer uses.  He otherwise feels fine with no other issues.

## 2024-08-19 NOTE — PHYSICAL EXAM
[de-identified] : In the volar surface of the left forearm on the radial side just overlying the inferior portion of his fistula is a 10 x 5 mm hyperkeratotic lesion consistent with a squamous cell carcinoma.  There is no deep fixation and he has no other associated lesions.  No epitrochlear adenopathy.

## 2024-08-27 ENCOUNTER — APPOINTMENT (OUTPATIENT)
Dept: NEPHROLOGY | Facility: CLINIC | Age: 59
End: 2024-08-27
Payer: COMMERCIAL

## 2024-08-27 VITALS
TEMPERATURE: 98.2 F | SYSTOLIC BLOOD PRESSURE: 120 MMHG | WEIGHT: 202 LBS | RESPIRATION RATE: 18 BRPM | HEIGHT: 68 IN | DIASTOLIC BLOOD PRESSURE: 76 MMHG | HEART RATE: 78 BPM | OXYGEN SATURATION: 96 % | BODY MASS INDEX: 30.62 KG/M2

## 2024-08-27 DIAGNOSIS — R80.9 PROTEINURIA, UNSPECIFIED: ICD-10-CM

## 2024-08-27 DIAGNOSIS — N40.0 BENIGN PROSTATIC HYPERPLASIA WITHOUT LOWER URINARY TRACT SYMPMS: ICD-10-CM

## 2024-08-27 DIAGNOSIS — E21.3 HYPERPARATHYROIDISM, UNSPECIFIED: ICD-10-CM

## 2024-08-27 DIAGNOSIS — E66.9 OBESITY, UNSPECIFIED: ICD-10-CM

## 2024-08-27 DIAGNOSIS — N18.4 CHRONIC KIDNEY DISEASE, STAGE 4 (SEVERE): ICD-10-CM

## 2024-08-27 DIAGNOSIS — E55.9 VITAMIN D DEFICIENCY, UNSPECIFIED: ICD-10-CM

## 2024-08-27 DIAGNOSIS — E78.5 HYPERLIPIDEMIA, UNSPECIFIED: ICD-10-CM

## 2024-08-27 DIAGNOSIS — E87.20 ACIDOSIS, UNSPECIFIED: ICD-10-CM

## 2024-08-27 DIAGNOSIS — E83.52 HYPERCALCEMIA: ICD-10-CM

## 2024-08-27 PROCEDURE — 99214 OFFICE O/P EST MOD 30 MIN: CPT

## 2024-08-27 PROCEDURE — G2211 COMPLEX E/M VISIT ADD ON: CPT

## 2024-08-27 NOTE — CONSULT LETTER
[Dear  ___] : Dear  [unfilled], [Consult Letter:] : I had the pleasure of evaluating your patient, [unfilled]. [Please see my note below.] : Please see my note below. [Consult Closing:] : Thank you very much for allowing me to participate in the care of this patient.  If you have any questions, please do not hesitate to contact me. [Sincerely,] : Sincerely, [FreeTextEntry3] : Jean [DrDemetrio  ___] : Dr. ARREDONDO [DrDemetrio ___] : Dr. ARREDONDO

## 2024-08-27 NOTE — PHYSICAL EXAM
[General Appearance - Alert] : alert [General Appearance - In No Acute Distress] : in no acute distress [Sclera] : the sclera and conjunctiva were normal [Extraocular Movements] : extraocular movements were intact [Neck Appearance] : the appearance of the neck was normal [] : no respiratory distress [Respiration, Rhythm And Depth] : normal respiratory rhythm and effort [Exaggerated Use Of Accessory Muscles For Inspiration] : no accessory muscle use [Auscultation Breath Sounds / Voice Sounds] : lungs were clear to auscultation bilaterally [Heart Rate And Rhythm] : heart rate was normal and rhythm regular [Heart Sounds] : normal S1 and S2 [Heart Sounds Gallop] : no gallops [Murmurs] : no murmurs [Heart Sounds Pericardial Friction Rub] : no pericardial rub [Edema] : there was no peripheral edema [Bowel Sounds] : normal bowel sounds [Abdomen Soft] : soft [Abdomen Tenderness] : non-tender [Abnormal Walk] : normal gait [Involuntary Movements] : no involuntary movements were seen [No Focal Deficits] : no focal deficits [Oriented To Time, Place, And Person] : oriented to person, place, and time [Impaired Insight] : insight and judgment were intact [Affect] : the affect was normal [Mood] : the mood was normal [___ (cm) Fistula] : [unfilled] (cm) fistula [Bruit] : a bruit was present [Thrill] : a thrill was present [FreeTextEntry1] : dry skin

## 2024-08-27 NOTE — HISTORY OF PRESENT ILLNESS
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 In 1984 he underwent a living related renal transplant at [facility] with Dr. Wong Higuera. The donor was his mother. I started seeing Mr. Bates after Dr. Samson Negrete moved his office. His [place] [facility] [place] of [place]. 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  I am seeing Jayme in person today. He continues to have a lot of pre-cancerous lesions on his skin. He sees dermatology every 3 months. He uses 5 fluorouracil on occasion. He currently has a squamous cell carcinoma sitting on the AV fistula.  He is scheduled to see Dr. Will Granger.  He saw Dr. Sara Pink (surgical oncology) who stated that he could remove it.   He has not yet started back on the [employer].

## 2024-08-27 NOTE — REVIEW OF SYSTEMS
[Recent Weight Gain (___ Lbs)] : recent [unfilled] ~Ulb weight gain [Nocturia] : nocturia [As Noted in HPI] : as noted in HPI [Negative] : Musculoskeletal

## 2024-08-27 NOTE — ASSESSMENT
[FreeTextEntry1] : ! ! (1) Renal Transplant. The BUN/creatinine is as follows: 34/1.8 (05/2016), 26/1.70 (01/2017), 30/1.71 (03/2018), 34/2.22 (08/2018), 36/2.0 (09/2018), 25/2.2 (02/2019), 37/2.13 (07/2019), 32/2.02 (11/11/2019), 42/2.19 (05/30/2020), 43/2.23 (09/11/2020), 44/2.21 (01/2021), 33/2.10 (04/2021), 38/2.27 (05/2021), 36/2.47 (09/2021), 33/2.18 (12/2021), 37/2.38 (02/2022), 38/2.53 (08/2022), 35/2.70 (11/2022), 44/2.28 (03/24/2023), 39/2.47 (06/05/2023), 49/2.64 (08/25/2023), 36/2.58 (10/07/2023),42/2.44 (02/13/2024), 39/2.53 (05/17/2024), 40/2.63 (08/17/2024). The eGFR is 27 mL per minute.  The urine protein to creatinine ratio was around 1.9 grams per gram in the past. 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 Mr. Bates was started on losartan 25 mg once daily (he had a difficult time sleeping on BID losartan and so cut out the evening dose). He tried taking 50 mg once daily though found it also affected his sleep. He has continued to take 25 mg once daily. His urine protein to creatinine ratio had since trended as follows 0.912 g/g, 1.08 g/g, 1.356 g/g (12/16/2021), 1.099 g/g (02/28/2022), 1.001 g/g (08/09/2022), 1.278 g/g (11/26/2023), 1.61 g/g (06/05/2023), 1.16 g/g (10/07/2023), 1.32 g/g (02/12/2024), 1.23 g/g (05/17/2024), 1.18 g/g. The BK virus is negative.  The serum CO2 concentration is 18 meq per liter on 1300 mg sodium bicarbonate once daily.  -Continue losartan 25 mg once daily -Continue Jardiance 10 mg once daily -Increase sodium bicarbonate to 1300 mg PO BID. -Stay well hydrated  (2) Hypertension. Well controlled in the office. -Keep salt out of the diet -Follow blood pressures at home -Continue regimen.  (3) Dyslipidemia. -Continue atorvastatin  (4) Squamous cell carcinoma. Recurrent. I spoke with Dr. Catracho Rodriguez re: another option for immunosuppression (Belatacept) who stated that this would be more problematic in terms of skin cancer. -Continue to see dermatology. -See Dr. Will Granger as one of the SCC lesions sits on the AV fistula.   (5) Hypercalcemia. The workup was negative. The serum calcium normalized after discontinuing Tums. It is currently 9.8 mg/dL. -Avoid Tums  (6) Mild hyperparathyroidism. The last iPTH is 88 pg per mL. The 25 hydroxy vitamin D level is 39.1 ng per mL. -No treatment for the mildly elevated iPTH.  (7) Proteinuria. The nephrotic workup was negative. Biopsy --> FSGS. See above. -See above. -Continue Jardiance 10 mg PO once daily.   (8) Poor urinary stream improved on Flomax. -Continue Flomax -See urology (either Dr. Sinan Rodríguez or Dr. Christian Guillen)  (9) Obesity. -We talked about weight loss and starting exercise.
no

## 2024-08-28 ENCOUNTER — RX RENEWAL (OUTPATIENT)
Age: 59
End: 2024-08-28

## 2024-09-06 ENCOUNTER — APPOINTMENT (OUTPATIENT)
Dept: VASCULAR SURGERY | Facility: CLINIC | Age: 59
End: 2024-09-06
Payer: COMMERCIAL

## 2024-09-06 VITALS
DIASTOLIC BLOOD PRESSURE: 72 MMHG | HEIGHT: 68 IN | SYSTOLIC BLOOD PRESSURE: 118 MMHG | WEIGHT: 200 LBS | BODY MASS INDEX: 30.31 KG/M2

## 2024-09-06 DIAGNOSIS — Z94.0 KIDNEY TRANSPLANT STATUS: ICD-10-CM

## 2024-09-06 PROCEDURE — 99243 OFF/OP CNSLTJ NEW/EST LOW 30: CPT

## 2024-09-07 NOTE — PHYSICAL EXAM
[de-identified] : In the volar surface of the left forearm on the radial side just overlying the inferior portion of his fistula is a 10 x 5 mm hyperkeratotic lesion consistent with a squamous cell carcinoma.  There is no deep fixation and he has no other associated lesions.  No epitrochlear adenopathy. [Alert] : alert [Oriented to Person] : oriented to person [Oriented to Place] : oriented to place [Oriented to Time] : oriented to time [de-identified] : Awake and Alert [FreeTextEntry1] : palpable pulse in AVF [de-identified] : lesion in the proximity of the AVF [de-identified] : Appropriate

## 2024-09-07 NOTE — ASSESSMENT
[FreeTextEntry1] : 59-year-old male with end-stage renal disease.  He currently has a renal transplant which is slowly failing and hemodialysis may need to be reinitiated at some point he has a squamous cell cancer of his left forearm in the vicinity of the AV fistula.  I examined the patient and he underwent a bedside ultrasound I believe there is enough room for the fistula to be excised safely.  I explained this to the patient.  He understands and will proceed with excision.

## 2024-09-07 NOTE — HISTORY OF PRESENT ILLNESS
[FreeTextEntry1] : 59-year-old male with ESRD  s/p a kidney transplant in 1984. The patient has had several   squamous cell carcinomas. He underwent a shave biopsy of a left forearm lesion which showed a well-differentiated squamous cell carcinoma.  The patient was referred by Dr. Littlejohn to determine if the squamous cell carcinoma could be excised while preserving the fistula, since the patient's transplant is slowly failing and he believes he will need to resume HD.

## 2024-09-16 ENCOUNTER — APPOINTMENT (OUTPATIENT)
Dept: VASCULAR SURGERY | Facility: CLINIC | Age: 59
End: 2024-09-16

## 2024-10-15 ENCOUNTER — TRANSCRIPTION ENCOUNTER (OUTPATIENT)
Age: 59
End: 2024-10-15

## 2024-11-20 ENCOUNTER — APPOINTMENT (OUTPATIENT)
Dept: GASTROENTEROLOGY | Facility: CLINIC | Age: 59
End: 2024-11-20
Payer: COMMERCIAL

## 2024-11-20 VITALS
HEIGHT: 68 IN | SYSTOLIC BLOOD PRESSURE: 130 MMHG | OXYGEN SATURATION: 98 % | BODY MASS INDEX: 30.31 KG/M2 | HEART RATE: 79 BPM | DIASTOLIC BLOOD PRESSURE: 60 MMHG | WEIGHT: 200 LBS

## 2024-11-20 DIAGNOSIS — Z12.11 ENCOUNTER FOR SCREENING FOR MALIGNANT NEOPLASM OF COLON: ICD-10-CM

## 2024-11-20 DIAGNOSIS — K21.9 GASTRO-ESOPHAGEAL REFLUX DISEASE W/OUT ESOPHAGITIS: ICD-10-CM

## 2024-11-20 PROCEDURE — 99204 OFFICE O/P NEW MOD 45 MIN: CPT

## 2024-11-20 PROCEDURE — G2211 COMPLEX E/M VISIT ADD ON: CPT

## 2024-11-20 RX ORDER — TAMSULOSIN HYDROCHLORIDE 0.4 MG/1
0.4 CAPSULE ORAL
Refills: 0 | Status: ACTIVE | COMMUNITY

## 2024-11-26 ENCOUNTER — APPOINTMENT (OUTPATIENT)
Dept: NEPHROLOGY | Facility: CLINIC | Age: 59
End: 2024-11-26
Payer: COMMERCIAL

## 2024-11-26 VITALS
TEMPERATURE: 98.5 F | DIASTOLIC BLOOD PRESSURE: 82 MMHG | SYSTOLIC BLOOD PRESSURE: 126 MMHG | BODY MASS INDEX: 30.77 KG/M2 | RESPIRATION RATE: 18 BRPM | HEART RATE: 73 BPM | OXYGEN SATURATION: 98 % | HEIGHT: 68 IN | WEIGHT: 203 LBS

## 2024-11-26 DIAGNOSIS — N05.1 UNSPECIFIED NEPHRITIC SYNDROME WITH FOCAL AND SEGMENTAL GLOMERULAR LESIONS: ICD-10-CM

## 2024-11-26 DIAGNOSIS — N18.32 CHRONIC KIDNEY DISEASE, STAGE 3B: ICD-10-CM

## 2024-11-26 DIAGNOSIS — E83.52 HYPERCALCEMIA: ICD-10-CM

## 2024-11-26 DIAGNOSIS — E78.5 HYPERLIPIDEMIA, UNSPECIFIED: ICD-10-CM

## 2024-11-26 DIAGNOSIS — E55.9 VITAMIN D DEFICIENCY, UNSPECIFIED: ICD-10-CM

## 2024-11-26 DIAGNOSIS — E21.3 HYPERPARATHYROIDISM, UNSPECIFIED: ICD-10-CM

## 2024-11-26 DIAGNOSIS — E87.20 ACIDOSIS, UNSPECIFIED: ICD-10-CM

## 2024-11-26 DIAGNOSIS — R80.9 PROTEINURIA, UNSPECIFIED: ICD-10-CM

## 2024-11-26 DIAGNOSIS — Z94.0 KIDNEY TRANSPLANT STATUS: ICD-10-CM

## 2024-11-26 PROCEDURE — 99215 OFFICE O/P EST HI 40 MIN: CPT

## 2024-12-24 PROBLEM — F10.90 ALCOHOL USE: Status: ACTIVE | Noted: 2017-09-05

## 2025-01-24 ENCOUNTER — RX RENEWAL (OUTPATIENT)
Age: 60
End: 2025-01-24

## 2025-02-10 RX ORDER — LOSARTAN POTASSIUM 25 MG/1
25 TABLET, FILM COATED ORAL
Qty: 90 | Refills: 2 | Status: ACTIVE | COMMUNITY
Start: 2025-02-10 | End: 1900-01-01

## 2025-03-06 DIAGNOSIS — Z12.11 ENCOUNTER FOR SCREENING FOR MALIGNANT NEOPLASM OF COLON: ICD-10-CM

## 2025-03-06 RX ORDER — POLYETHYLENE GLYCOL 3350 17 G/17G
17 POWDER, FOR SOLUTION ORAL
Qty: 238 | Refills: 0 | Status: ACTIVE | COMMUNITY
Start: 2025-03-06 | End: 1900-01-01

## 2025-03-11 ENCOUNTER — APPOINTMENT (OUTPATIENT)
Dept: NEPHROLOGY | Facility: CLINIC | Age: 60
End: 2025-03-11